# Patient Record
Sex: MALE | URBAN - METROPOLITAN AREA
[De-identification: names, ages, dates, MRNs, and addresses within clinical notes are randomized per-mention and may not be internally consistent; named-entity substitution may affect disease eponyms.]

---

## 2020-03-16 ENCOUNTER — EMERGENCY (EMERGENCY)
Facility: HOSPITAL | Age: 60
LOS: 1 days | Discharge: HOME | End: 2020-03-16
Attending: INTERNAL MEDICINE | Admitting: INTERNAL MEDICINE
Payer: SELF-PAY

## 2020-03-16 VITALS
OXYGEN SATURATION: 100 % | SYSTOLIC BLOOD PRESSURE: 225 MMHG | RESPIRATION RATE: 16 BRPM | WEIGHT: 205.03 LBS | HEART RATE: 101 BPM | TEMPERATURE: 98 F | DIASTOLIC BLOOD PRESSURE: 130 MMHG

## 2020-03-16 DIAGNOSIS — G45.9 TRANSIENT CEREBRAL ISCHEMIC ATTACK, UNSPECIFIED: ICD-10-CM

## 2020-03-16 DIAGNOSIS — I10 ESSENTIAL (PRIMARY) HYPERTENSION: ICD-10-CM

## 2020-03-16 LAB
ALBUMIN SERPL ELPH-MCNC: 4.3 G/DL — SIGNIFICANT CHANGE UP (ref 3.3–5)
ALP SERPL-CCNC: 77 U/L — SIGNIFICANT CHANGE UP (ref 40–120)
ALT FLD-CCNC: 49 U/L — HIGH (ref 10–45)
ANION GAP SERPL CALC-SCNC: 5 MMOL/L — SIGNIFICANT CHANGE UP (ref 5–17)
AST SERPL-CCNC: 27 U/L — SIGNIFICANT CHANGE UP (ref 10–40)
BILIRUB SERPL-MCNC: 0.5 MG/DL — SIGNIFICANT CHANGE UP (ref 0.2–1.2)
BUN SERPL-MCNC: 20 MG/DL — SIGNIFICANT CHANGE UP (ref 7–23)
CALCIUM SERPL-MCNC: 9.2 MG/DL — SIGNIFICANT CHANGE UP (ref 8.4–10.5)
CHLORIDE SERPL-SCNC: 107 MMOL/L — SIGNIFICANT CHANGE UP (ref 96–108)
CO2 SERPL-SCNC: 30 MMOL/L — SIGNIFICANT CHANGE UP (ref 22–31)
CREAT SERPL-MCNC: 1.24 MG/DL — SIGNIFICANT CHANGE UP (ref 0.5–1.3)
GLUCOSE SERPL-MCNC: 125 MG/DL — HIGH (ref 70–99)
HCT VFR BLD CALC: 50.6 % — HIGH (ref 39–50)
HGB BLD-MCNC: 16.8 G/DL — SIGNIFICANT CHANGE UP (ref 13–17)
MCHC RBC-ENTMCNC: 30.7 PG — SIGNIFICANT CHANGE UP (ref 27–34)
MCHC RBC-ENTMCNC: 33.2 GM/DL — SIGNIFICANT CHANGE UP (ref 32–36)
MCV RBC AUTO: 92.3 FL — SIGNIFICANT CHANGE UP (ref 80–100)
NRBC # BLD: 0 /100 WBCS — SIGNIFICANT CHANGE UP (ref 0–0)
PLATELET # BLD AUTO: 234 K/UL — SIGNIFICANT CHANGE UP (ref 150–400)
POTASSIUM SERPL-MCNC: 3.9 MMOL/L — SIGNIFICANT CHANGE UP (ref 3.5–5.3)
POTASSIUM SERPL-SCNC: 3.9 MMOL/L — SIGNIFICANT CHANGE UP (ref 3.5–5.3)
PROT SERPL-MCNC: 7.7 G/DL — SIGNIFICANT CHANGE UP (ref 6–8.3)
RBC # BLD: 5.48 M/UL — SIGNIFICANT CHANGE UP (ref 4.2–5.8)
RBC # FLD: 12.1 % — SIGNIFICANT CHANGE UP (ref 10.3–14.5)
SODIUM SERPL-SCNC: 142 MMOL/L — SIGNIFICANT CHANGE UP (ref 135–145)
TROPONIN I SERPL-MCNC: <.017 NG/ML — LOW (ref 0.02–0.06)
WBC # BLD: 7.34 K/UL — SIGNIFICANT CHANGE UP (ref 3.8–10.5)
WBC # FLD AUTO: 7.34 K/UL — SIGNIFICANT CHANGE UP (ref 3.8–10.5)

## 2020-03-16 PROCEDURE — 99220: CPT

## 2020-03-16 PROCEDURE — 99285 EMERGENCY DEPT VISIT HI MDM: CPT

## 2020-03-16 PROCEDURE — 71045 X-RAY EXAM CHEST 1 VIEW: CPT | Mod: 26

## 2020-03-16 PROCEDURE — 93880 EXTRACRANIAL BILAT STUDY: CPT | Mod: 26

## 2020-03-16 PROCEDURE — 93010 ELECTROCARDIOGRAM REPORT: CPT

## 2020-03-16 PROCEDURE — 99243 OFF/OP CNSLTJ NEW/EST LOW 30: CPT

## 2020-03-16 PROCEDURE — 70450 CT HEAD/BRAIN W/O DYE: CPT | Mod: 26

## 2020-03-16 RX ORDER — AMLODIPINE BESYLATE 2.5 MG/1
10 TABLET ORAL DAILY
Refills: 0 | Status: DISCONTINUED | OUTPATIENT
Start: 2020-03-16 | End: 2020-03-18

## 2020-03-16 RX ORDER — LABETALOL HCL 100 MG
10 TABLET ORAL ONCE
Refills: 0 | Status: COMPLETED | OUTPATIENT
Start: 2020-03-16 | End: 2020-03-16

## 2020-03-16 RX ORDER — ASPIRIN/CALCIUM CARB/MAGNESIUM 324 MG
81 TABLET ORAL DAILY
Refills: 0 | Status: DISCONTINUED | OUTPATIENT
Start: 2020-03-17 | End: 2020-03-18

## 2020-03-16 RX ORDER — SODIUM CHLORIDE 9 MG/ML
1000 INJECTION INTRAMUSCULAR; INTRAVENOUS; SUBCUTANEOUS
Refills: 0 | Status: COMPLETED | OUTPATIENT
Start: 2020-03-16 | End: 2020-03-16

## 2020-03-16 RX ORDER — ATORVASTATIN CALCIUM 80 MG/1
80 TABLET, FILM COATED ORAL AT BEDTIME
Refills: 0 | Status: DISCONTINUED | OUTPATIENT
Start: 2020-03-16 | End: 2020-03-20

## 2020-03-16 RX ORDER — ACETAMINOPHEN 500 MG
975 TABLET ORAL ONCE
Refills: 0 | Status: COMPLETED | OUTPATIENT
Start: 2020-03-16 | End: 2020-03-16

## 2020-03-16 RX ORDER — LABETALOL HCL 100 MG
10 TABLET ORAL
Refills: 0 | Status: COMPLETED | OUTPATIENT
Start: 2020-03-16 | End: 2020-03-16

## 2020-03-16 RX ORDER — ACETAMINOPHEN 500 MG
650 TABLET ORAL EVERY 6 HOURS
Refills: 0 | Status: DISCONTINUED | OUTPATIENT
Start: 2020-03-16 | End: 2020-03-20

## 2020-03-16 RX ORDER — ASPIRIN/CALCIUM CARB/MAGNESIUM 324 MG
162 TABLET ORAL DAILY
Refills: 0 | Status: DISCONTINUED | OUTPATIENT
Start: 2020-03-16 | End: 2020-03-16

## 2020-03-16 RX ADMIN — Medication 10 MILLIGRAM(S): at 11:51

## 2020-03-16 RX ADMIN — Medication 10 MILLIGRAM(S): at 13:16

## 2020-03-16 RX ADMIN — SODIUM CHLORIDE 1000 MILLILITER(S): 9 INJECTION INTRAMUSCULAR; INTRAVENOUS; SUBCUTANEOUS at 12:34

## 2020-03-16 RX ADMIN — Medication 162 MILLIGRAM(S): at 10:37

## 2020-03-16 RX ADMIN — Medication 975 MILLIGRAM(S): at 15:44

## 2020-03-16 RX ADMIN — Medication 10 MILLIGRAM(S): at 11:32

## 2020-03-16 RX ADMIN — Medication 650 MILLIGRAM(S): at 20:30

## 2020-03-16 RX ADMIN — Medication 650 MILLIGRAM(S): at 21:30

## 2020-03-16 RX ADMIN — AMLODIPINE BESYLATE 10 MILLIGRAM(S): 2.5 TABLET ORAL at 13:16

## 2020-03-16 RX ADMIN — SODIUM CHLORIDE 1000 MILLILITER(S): 9 INJECTION INTRAMUSCULAR; INTRAVENOUS; SUBCUTANEOUS at 10:34

## 2020-03-16 RX ADMIN — ATORVASTATIN CALCIUM 80 MILLIGRAM(S): 80 TABLET, FILM COATED ORAL at 21:14

## 2020-03-16 RX ADMIN — Medication 975 MILLIGRAM(S): at 16:39

## 2020-03-16 RX ADMIN — SODIUM CHLORIDE 1000 MILLILITER(S): 9 INJECTION INTRAMUSCULAR; INTRAVENOUS; SUBCUTANEOUS at 10:37

## 2020-03-16 RX ADMIN — SODIUM CHLORIDE 1000 MILLILITER(S): 9 INJECTION INTRAMUSCULAR; INTRAVENOUS; SUBCUTANEOUS at 11:32

## 2020-03-16 NOTE — H&P ADULT - NSHPLABSRESULTS_GEN_ALL_CORE
.  LABS:                         16.8   7.34  )-----------( 234      ( 16 Mar 2020 10:20 )             50.6     03-16    142  |  107  |  20  ----------------------------<  125<H>  3.9   |  30  |  1.24    Ca    9.2      16 Mar 2020 10:20    TPro  7.7  /  Alb  4.3  /  TBili  0.5  /  DBili  x   /  AST  27  /  ALT  49<H>  /  AlkPhos  77  03-16        CARDIAC MARKERS ( 16 Mar 2020 10:20 )  <.017 ng/mL / x     / x     / x     / x                RADIOLOGY, EKG & ADDITIONAL TESTS: Reviewed.     ECG: normal sinus  < from: Xray Chest 1 View AP/PA (03.16.20 @ 10:38) >      FINDINGS: Heart size appears within normal limits. No hilar or superior mediastinal abnormalities are identified.    There is no evidence for focal infiltrate, lobar consolidation or pulmonary edema. No pleural effusion or pneumothorax is demonstrated. No mediastinal shift is noted. The visualized osseous structures appear unremarkable.    IMPRESSION: No evidence for focal infiltrate or lobar consolidation.    < end of copied text >

## 2020-03-16 NOTE — H&P ADULT - ASSESSMENT
60 yo M with HTN presents with dizziness and RUL weakness , symptoms resolved    #Dizziness: suspects TIA in setting of uncontrolled HTN. CT head negative for acute infarct or hemorrhage.  - current NIHSS 0  - continue aspirin and Lipitor  - check lipid profile, HbA1C  - obtain ECHO, carotid duplex  - BP control as below  - Neurology consulted, Dr. Altamirano to see    #Hypertensive emergency: /130 on arrival, presented with TIA symptoms  - s/p labetalol 10mg IV in ED x 2  - Additional labetalol 10mg IV now as BP still ~200 systolic  - Start Amlodipine 10mg daily  - Goal reduction no more than 25%, aim for 160-170/80-90 within first 24 hrs  #DVT ppx:  - SCDs  - encourage ambulation    CAPRINI SCORE [CLOT]    AGE RELATED RISK FACTORS                                                       MOBILITY RELATED FACTORS  [x ] Age 41-60 years                                            (1 Point)                  [ ] Bed rest                                                        (1 Point)  [ ] Age: 61-74 years                                           (2 Points)                 [ ] Plaster cast                                                   (2 Points)  [ ] Age= 75 years                                              (3 Points)                 [ ] Bed bound for more than 72 hours                 (2 Points)    DISEASE RELATED RISK FACTORS                                               GENDER SPECIFIC FACTORS  [ ] Edema in the lower extremities                       (1 Point)                  [ ] Pregnancy                                                     (1 Point)  [ ] Varicose veins                                               (1 Point)                  [ ] Post-partum < 6 weeks                                   (1 Point)             [ ] BMI > 25 Kg/m2                                            (1 Point)                  [ ] Hormonal therapy  or oral contraception          (1 Point)                 [ ] Sepsis (in the previous month)                        (1 Point)                  [ ] History of pregnancy complications                 (1 point)  [ ] Pneumonia or serious lung disease                                               [ ] Unexplained or recurrent                     (1 Point)           (in the previous month)                               (1 Point)  [ ] Abnormal pulmonary function test                     (1 Point)                 SURGERY RELATED RISK FACTORS  [ ] Acute myocardial infarction                              (1 Point)                 [ ]  Section                                             (1 Point)  [ ] Congestive heart failure (in the previous month)  (1 Point)               [ ] Minor surgery                                                  (1 Point)   [ ] Inflammatory bowel disease                             (1 Point)                 [ ] Arthroscopic surgery                                        (2 Points)  [ ] Central venous access                                      (2 Points)                [ ] General surgery lasting more than 45 minutes   (2 Points)       [ ] Stroke (in the previous month)                          (5 Points)               [ ] Elective arthroplasty                                         (5 Points)                                                                                                                                               HEMATOLOGY RELATED FACTORS                                                 TRAUMA RELATED RISK FACTORS  [ ] Prior episodes of VTE                                     (3 Points)                [ ] Fracture of the hip, pelvis, or leg                       (5 Points)  [ ] Positive family history for VTE                         (3 Points)                 [ ] Acute spinal cord injury (in the previous month)  (5 Points)  [ ] Prothrombin 16949 A                                     (3 Points)                 [ ] Paralysis  (less than 1 month)                             (5 Points)  [ ] Factor V Leiden                                             (3 Points)                  [ ] Multiple Trauma within 1 month                        (5 Points)  [ ] Lupus anticoagulants                                     (3 Points)                                                           [ ] Anticardiolipin antibodies                               (3 Points)                                                       [ ] High homocysteine in the blood                      (3 Points)                                             [ ] Other congenital or acquired thrombophilia      (3 Points)                                                [ ] Heparin induced thrombocytopenia                  (3 Points)                                          Total Score [   1       ]    Caprini Score 0 - 2:  Low Risk, No VTE Prophylaxis required for most patients, encourage ambulation  Caprini Score 3 - 6:  At Risk, pharmacologic VTE prophylaxis is indicated for most patients (in the absence of a contraindication)  Caprini Score Greater than or = 7:  High Risk, pharmacologic VTE prophylaxis is indicated for most patients (in the absence of a contraindication)

## 2020-03-16 NOTE — H&P ADULT - NSHPPHYSICALEXAM_GEN_ALL_CORE
.  VITAL SIGNS:  T(C): 36.8 (03-16-20 @ 10:04), Max: 36.8 (03-16-20 @ 10:04)  T(F): 98.2 (03-16-20 @ 10:04), Max: 98.2 (03-16-20 @ 10:04)  HR: 69 (03-16-20 @ 12:26) (62 - 101)  BP: 194/91 (03-16-20 @ 12:26) (191/106 - 225/130)  BP(mean): 128 (03-16-20 @ 11:43) (128 - 128)  RR: 15 (03-16-20 @ 12:26) (11 - 16)  SpO2: 100% (03-16-20 @ 12:26) (99% - 100%)  Wt(kg): --    PHYSICAL EXAM:    Constitutional: WDWN resting comfortably in bed; NAD  Head: NC/AT  Eyes: PERRLA, EOMI, clear conjunctiva  ENT: no nasal discharge; no oropharyngeal erythema or exudates; moist oral mucosa  Neck: supple; no JVD or thyromegaly  Respiratory: CTA B/L; no W/R/R, no retractions  Cardiac: +S1/S2; RRR; no M/R/G; PMI non-displaced  Gastrointestinal: soft, NT/ND; no rebound or guarding; +BS, no hepatosplenomegaly  Extremities: WWP, no clubbing or cyanosis; no peripheral edema  Neurologic: AAOx3; answers questions appropriately, CN2-12 intact, sensation intact to light touch throughout, motor 5/5 in all extremities, no dysmetria on FTN or HTS, no DDK

## 2020-03-16 NOTE — H&P ADULT - HISTORY OF PRESENT ILLNESS
60 yo M with PMHx of HTN (self discontinued meds many years ago), former smoker presents with dizziness and RUL weakness that resolved in <5 min around 9:45AM today. Pt states he first felt dizzy then his right arm feels a little shaky, maybe weaker than usual. His symptoms all resolved by the time he arrived to ED which is 5 minutes away from his home. He denies headache, nausea, vomiting, chest pain, palpitations, slurred speech, word finding difficulty, numbness or tingling of extremities, fevers, chills, cough, SOB, abdominal pain, or urinary symptoms.    Of note, pt was on Losartan and Amlodipine many years ago, but self discontinued due to concern of side effects affecting his kidneys. He is a former smoker, 1 pack/year x 15 years, quit 15 years ago. 58 yo M with PMHx of HTN (self discontinued meds many years ago), former smoker presents with dizziness and RUL weakness that resolved in <5 min around 9:45AM today. Pt states he first felt dizzy then his right arm feels a little shaky, maybe weaker than usual. His symptoms all resolved by the time he arrived to ED which is 5 minutes away from his home. He denies headache, nausea, vomiting, chest pain, palpitations, slurred speech, word finding difficulty, numbness or tingling of extremities, fevers, chills, cough, SOB, abdominal pain, or urinary symptoms.    Of note, pt was on Losartan and Amlodipine many years ago, but self discontinued due to concern of side effects affecting his kidneys. He is a former smoker, 1 pack/year x 15 years, quit 15 years ago.

## 2020-03-16 NOTE — ED ADULT NURSE NOTE - OBJECTIVE STATEMENT
Pt presents to the ED awake, alert, oriented x3. Pt reports he felt a few seconds of dizziness about a half hour prior to arrival in ED. Pt reports dizziness has resolved at this time. Denies chest pain or shortness of breath.

## 2020-03-16 NOTE — H&P ADULT - NSHPSOCIALHISTORY_GEN_ALL_CORE
Former smoker, 1 pack/year x 15 years, quit 15 years ago  Occasional etOH use  Denies illicit drug use  Used to live in [place], moved to [place] in 2015 to take care of his mother after her stroke. Flies back and forth to [place]

## 2020-03-16 NOTE — PATIENT PROFILE ADULT - PSYCHOSOCIAL CONCERNS
Received a call from Mervin at [facility], she is requesting another PT referral for the pt. Will you please put in a new referral in pt's chart. Please let me know so I can fax the referral to mervin,     Thank you.    Mervin's call back # [phone]  Fax # [phone]   none

## 2020-03-16 NOTE — H&P ADULT - NSHPREVIEWOFSYSTEMS_GEN_ALL_CORE
REVIEW OF SYSTEMS:    CONSTITUTIONAL: No weakness, fevers or chills  EYES/ENT: No visual changes;  No throat pain   NECK: No pain or stiffness  RESPIRATORY: No cough, wheezing, hemoptysis; No shortness of breath  CARDIOVASCULAR: No chest pain or palpitations  GASTROINTESTINAL: No abdominal or epigastric pain. No nausea, vomiting, or hematemesis; No diarrhea or constipation. No melena or hematochezia.  GENITOURINARY: No dysuria, frequency or hematuria  NEUROLOGICAL: SEE HPI  SKIN: No itching, burning, rashes, or lesions   MSK: no joint pain, no joint swelling  All other review of systems is negative unless indicated above.

## 2020-03-17 LAB
ANION GAP SERPL CALC-SCNC: 6 MMOL/L — SIGNIFICANT CHANGE UP (ref 5–17)
BASOPHILS # BLD AUTO: 0.07 K/UL — SIGNIFICANT CHANGE UP (ref 0–0.2)
BASOPHILS NFR BLD AUTO: 0.9 % — SIGNIFICANT CHANGE UP (ref 0–2)
BUN SERPL-MCNC: 17 MG/DL — SIGNIFICANT CHANGE UP (ref 7–23)
CALCIUM SERPL-MCNC: 9.1 MG/DL — SIGNIFICANT CHANGE UP (ref 8.4–10.5)
CHLORIDE SERPL-SCNC: 106 MMOL/L — SIGNIFICANT CHANGE UP (ref 96–108)
CHOLEST SERPL-MCNC: 203 MG/DL — HIGH (ref 10–199)
CO2 SERPL-SCNC: 30 MMOL/L — SIGNIFICANT CHANGE UP (ref 22–31)
CREAT SERPL-MCNC: 1.14 MG/DL — SIGNIFICANT CHANGE UP (ref 0.5–1.3)
EOSINOPHIL # BLD AUTO: 0.1 K/UL — SIGNIFICANT CHANGE UP (ref 0–0.5)
EOSINOPHIL NFR BLD AUTO: 1.3 % — SIGNIFICANT CHANGE UP (ref 0–6)
GLUCOSE SERPL-MCNC: 109 MG/DL — HIGH (ref 70–99)
HCT VFR BLD CALC: 47 % — SIGNIFICANT CHANGE UP (ref 39–50)
HCV AB S/CO SERPL IA: 0.15 S/CO — SIGNIFICANT CHANGE UP (ref 0–0.99)
HCV AB SERPL-IMP: SIGNIFICANT CHANGE UP
HDLC SERPL-MCNC: 37 MG/DL — LOW
HGB BLD-MCNC: 15.5 G/DL — SIGNIFICANT CHANGE UP (ref 13–17)
IMM GRANULOCYTES NFR BLD AUTO: 0.4 % — SIGNIFICANT CHANGE UP (ref 0–1.5)
LIPID PNL WITH DIRECT LDL SERPL: 117 MG/DL — HIGH
LYMPHOCYTES # BLD AUTO: 2.73 K/UL — SIGNIFICANT CHANGE UP (ref 1–3.3)
LYMPHOCYTES # BLD AUTO: 35.1 % — SIGNIFICANT CHANGE UP (ref 13–44)
MAGNESIUM SERPL-MCNC: 1.7 MG/DL — SIGNIFICANT CHANGE UP (ref 1.6–2.6)
MCHC RBC-ENTMCNC: 30.5 PG — SIGNIFICANT CHANGE UP (ref 27–34)
MCHC RBC-ENTMCNC: 33 GM/DL — SIGNIFICANT CHANGE UP (ref 32–36)
MCV RBC AUTO: 92.5 FL — SIGNIFICANT CHANGE UP (ref 80–100)
MONOCYTES # BLD AUTO: 0.61 K/UL — SIGNIFICANT CHANGE UP (ref 0–0.9)
MONOCYTES NFR BLD AUTO: 7.9 % — SIGNIFICANT CHANGE UP (ref 2–14)
NEUTROPHILS # BLD AUTO: 4.23 K/UL — SIGNIFICANT CHANGE UP (ref 1.8–7.4)
NEUTROPHILS NFR BLD AUTO: 54.4 % — SIGNIFICANT CHANGE UP (ref 43–77)
NRBC # BLD: 0 /100 WBCS — SIGNIFICANT CHANGE UP (ref 0–0)
PLATELET # BLD AUTO: 223 K/UL — SIGNIFICANT CHANGE UP (ref 150–400)
POTASSIUM SERPL-MCNC: 4.1 MMOL/L — SIGNIFICANT CHANGE UP (ref 3.5–5.3)
POTASSIUM SERPL-SCNC: 4.1 MMOL/L — SIGNIFICANT CHANGE UP (ref 3.5–5.3)
RBC # BLD: 5.08 M/UL — SIGNIFICANT CHANGE UP (ref 4.2–5.8)
RBC # FLD: 12.2 % — SIGNIFICANT CHANGE UP (ref 10.3–14.5)
SODIUM SERPL-SCNC: 142 MMOL/L — SIGNIFICANT CHANGE UP (ref 135–145)
TOTAL CHOLESTEROL/HDL RATIO MEASUREMENT: 5.5 RATIO — SIGNIFICANT CHANGE UP (ref 3.4–9.6)
TRIGL SERPL-MCNC: 247 MG/DL — HIGH (ref 10–149)
TSH SERPL-MCNC: 4.46 UIU/ML — HIGH (ref 0.27–4.2)
WBC # BLD: 7.77 K/UL — SIGNIFICANT CHANGE UP (ref 3.8–10.5)
WBC # FLD AUTO: 7.77 K/UL — SIGNIFICANT CHANGE UP (ref 3.8–10.5)

## 2020-03-17 PROCEDURE — 93306 TTE W/DOPPLER COMPLETE: CPT | Mod: 26

## 2020-03-17 PROCEDURE — 99225: CPT

## 2020-03-17 RX ADMIN — Medication 650 MILLIGRAM(S): at 21:50

## 2020-03-17 RX ADMIN — AMLODIPINE BESYLATE 10 MILLIGRAM(S): 2.5 TABLET ORAL at 05:42

## 2020-03-17 RX ADMIN — ATORVASTATIN CALCIUM 80 MILLIGRAM(S): 80 TABLET, FILM COATED ORAL at 21:31

## 2020-03-17 RX ADMIN — Medication 650 MILLIGRAM(S): at 20:51

## 2020-03-17 RX ADMIN — Medication 81 MILLIGRAM(S): at 13:25

## 2020-03-17 NOTE — PROGRESS NOTE ADULT - ASSESSMENT
60 yo M with HTN presents with dizziness and RUL weakness , symptoms resolved    #Dizziness: Now resolved  Lijkely TIA in setting of uncontrolled HTN. CT head negative for acute infarct or hemorrhage.  Appreciate Neuro recs : will do MRI , pending ECHO/carotid duplex  Reviewed lipid profile: inc. TG/cholesterol, HbA1C pending  continue aspirin and Lipitor  US carotid duplex: neg for stenosis, ECHO findings reviewed as above  #Hypertensive emergency: /130 on arrival, presented with TIA symptoms  Currently BP: 150-160s/90s , cont Amlodipine/ will consider adding ACE/ARBs if remains uncontrolled    #DVT ppx: SCDs/ encourage ambulation 58 yo M with HTN presents with dizziness and RUL weakness , symptoms resolved    #Dizziness: Now resolved  Lijkely TIA in setting of uncontrolled HTN. CT head negative for acute infarct or hemorrhage.  Appreciate Neuro recs : will do MRI , US carotid duplex: neg for stenosis, ECHO findings reviewed as above  Reviewed lipid profile: inc. TG/cholesterol, HbA1C pending  continue aspirin and Lipitor  #Hypertensive emergency: /130 on arrival, presented with TIA symptoms  Currently BP: 150-160s/90s , cont Amlodipine/ will consider adding ACE/ARBs if remains uncontrolled    #DVT ppx: SCDs/ encourage ambulation

## 2020-03-17 NOTE — PROGRESS NOTE ADULT - SUBJECTIVE AND OBJECTIVE BOX
Patient is a 59y old  Male who presents with a chief complaint of dizziness    HPI  58 yo M with PMHx of HTN (self discontinued meds many years ago), former smoker presents with dizziness and RUL weakness that resolved in <5 min around 9:45AM today. Pt states he first felt dizzy then his right arm feels a little shaky, maybe weaker than usual. His symptoms all resolved by the time he arrived to ED which is 5 minutes away from his home. He denies headache, nausea, vomiting, chest pain, palpitations, slurred speech, word finding difficulty, numbness or tingling of extremities, fevers, chills, cough, SOB, abdominal pain, or urinary symptoms.of note, pt was on Losartan and Amlodipine many years ago, but self discontinued due to concern of side effects affecting his kidneys. He is a former smoker, 1 pack/year x 15 years, quit 15 years ago.     Interval Hx  Patient seen and examined at bedside. No overnight events reported.     ALLERGIES:  No Known Allergies    MEDICATIONS  (STANDING):  amLODIPine   Tablet 10 milliGRAM(s) Oral daily  aspirin enteric coated 81 milliGRAM(s) Oral daily  atorvastatin 80 milliGRAM(s) Oral at bedtime    MEDICATIONS  (PRN):  acetaminophen   Tablet .. 650 milliGRAM(s) Oral every 6 hours PRN Temp greater or equal to 38C (100.4F), Mild Pain (1 - 3)    Vital Signs Last 24 Hrs  T(F): 97.7 (17 Mar 2020 10:04), Max: 98.3 (17 Mar 2020 01:00)  HR: 79 (17 Mar 2020 10:04) (68 - 79)  BP: 157/94 (17 Mar 2020 10:04) (128/93 - 196/105)  RR: 17 (17 Mar 2020 10:04) (13 - 17)  SpO2: 93% (17 Mar 2020 10:04) (93% - 100%)  I&O's Summary    17 Mar 2020 07:01  -  17 Mar 2020 12:20  --------------------------------------------------------  IN: 120 mL / OUT: 0 mL / NET: 120 mL      PHYSICAL EXAM:  General: NAD, A/O x 3  ENT: MMM, no thrush  Neck: Supple, No JVD  Lungs: Clear to auscultation bilaterally, good air entry, non-labored breathing  Cardio: RRR, S1/S2, No murmur  Abdomen: Soft, Nontender, Nondistended; Bowel sounds present  Extremities: No calf tenderness, No pitting edema        LABS:                        15.5   7.77  )-----------( 223      ( 17 Mar 2020 05:42 )             47.0     03-17    142  |  106  |  17  ----------------------------<  109  4.1   |  30  |  1.14    Ca    9.1      17 Mar 2020 05:42  Mg     1.7     03-17    TPro  7.7  /  Alb  4.3  /  TBili  0.5  /  DBili  x   /  AST  27  /  ALT  49  /  AlkPhos  77  03-16        eGFR if Non African American: 70 mL/min/1.73M2 (03-17-20 @ 05:42)  eGFR if : 82 mL/min/1.73M2 (03-17-20 @ 05:42)          CARDIAC MARKERS ( 16 Mar 2020 10:20 )  <.017 ng/mL / x     / x     / x     / x          03-17 Chol 203 mg/dL  mg/dL HDL 37 mg/dL Trig 247 mg/dL  TSH 4.46   TSH with FT4 reflex --  Total T3 --      RADIOLOGY & ADDITIONAL TESTS:    < from: TTE Echo Complete w/ Contrast w/ Doppler (03.17.20 @ 10:00) >  . Left ventricular ejection fraction, by visual estimation, is 60 to 65%.   2. Normal global left ventricular systolic function.   3. Spectral Doppler shows impaired relaxation pattern of left ventricular myocardial filling (Grade I diastolic dysfunction).   4. There is mild septal left ventricular hypertrophy.   5. The left atrium is normal in size.   6. Normal right atrial size.   7. The right atrium is normal in size.   8. There is no evidence of pericardial effusion.   9. Thickening of the anterior and posterior mitral valve leaflets.  10. Sclerotic aortic valve with normal opening.  11. Structurally normal pulmonic valve, with normal leaflet excursion.    < end of copied text >      Care Discussed with Consultants/Other Providers:

## 2020-03-18 ENCOUNTER — TRANSCRIPTION ENCOUNTER (OUTPATIENT)
Age: 60
End: 2020-03-18

## 2020-03-18 ENCOUNTER — APPOINTMENT (OUTPATIENT)
Dept: MRI IMAGING | Facility: HOSPITAL | Age: 60
End: 2020-03-18

## 2020-03-18 ENCOUNTER — EMERGENCY (EMERGENCY)
Facility: HOSPITAL | Age: 60
LOS: 1 days | Discharge: HOME | End: 2020-03-18
Attending: EMERGENCY MEDICINE | Admitting: HOSPITALIST
Payer: SELF-PAY

## 2020-03-18 VITALS
DIASTOLIC BLOOD PRESSURE: 93 MMHG | TEMPERATURE: 98 F | OXYGEN SATURATION: 93 % | HEART RATE: 86 BPM | SYSTOLIC BLOOD PRESSURE: 149 MMHG | RESPIRATION RATE: 16 BRPM

## 2020-03-18 VITALS
HEART RATE: 95 BPM | DIASTOLIC BLOOD PRESSURE: 123 MMHG | RESPIRATION RATE: 19 BRPM | OXYGEN SATURATION: 98 % | WEIGHT: 199.96 LBS | HEIGHT: 69 IN | SYSTOLIC BLOOD PRESSURE: 181 MMHG | TEMPERATURE: 99 F

## 2020-03-18 LAB
ALBUMIN SERPL ELPH-MCNC: 4.5 G/DL — SIGNIFICANT CHANGE UP (ref 3.3–5)
ALP SERPL-CCNC: 79 U/L — SIGNIFICANT CHANGE UP (ref 40–120)
ALT FLD-CCNC: 57 U/L — HIGH (ref 10–45)
ANION GAP SERPL CALC-SCNC: 12 MMOL/L — SIGNIFICANT CHANGE UP (ref 5–17)
APTT BLD: 32.8 SEC — SIGNIFICANT CHANGE UP (ref 27.5–36.3)
AST SERPL-CCNC: 33 U/L — SIGNIFICANT CHANGE UP (ref 10–40)
BASOPHILS # BLD AUTO: 0.07 K/UL — SIGNIFICANT CHANGE UP (ref 0–0.2)
BASOPHILS NFR BLD AUTO: 0.7 % — SIGNIFICANT CHANGE UP (ref 0–2)
BILIRUB SERPL-MCNC: 0.9 MG/DL — SIGNIFICANT CHANGE UP (ref 0.2–1.2)
BUN SERPL-MCNC: 22 MG/DL — SIGNIFICANT CHANGE UP (ref 7–23)
CALCIUM SERPL-MCNC: 9.8 MG/DL — SIGNIFICANT CHANGE UP (ref 8.4–10.5)
CHLORIDE SERPL-SCNC: 104 MMOL/L — SIGNIFICANT CHANGE UP (ref 96–108)
CO2 SERPL-SCNC: 25 MMOL/L — SIGNIFICANT CHANGE UP (ref 22–31)
CREAT SERPL-MCNC: 1.19 MG/DL — SIGNIFICANT CHANGE UP (ref 0.5–1.3)
EOSINOPHIL # BLD AUTO: 0.13 K/UL — SIGNIFICANT CHANGE UP (ref 0–0.5)
EOSINOPHIL NFR BLD AUTO: 1.4 % — SIGNIFICANT CHANGE UP (ref 0–6)
GLUCOSE SERPL-MCNC: 96 MG/DL — SIGNIFICANT CHANGE UP (ref 70–99)
HBA1C BLD-MCNC: 5.6 % — SIGNIFICANT CHANGE UP (ref 4–5.6)
HCT VFR BLD CALC: 49.9 % — SIGNIFICANT CHANGE UP (ref 39–50)
HGB BLD-MCNC: 16.8 G/DL — SIGNIFICANT CHANGE UP (ref 13–17)
IMM GRANULOCYTES NFR BLD AUTO: 0.4 % — SIGNIFICANT CHANGE UP (ref 0–1.5)
INR BLD: 1.03 RATIO — SIGNIFICANT CHANGE UP (ref 0.88–1.16)
LYMPHOCYTES # BLD AUTO: 2.64 K/UL — SIGNIFICANT CHANGE UP (ref 1–3.3)
LYMPHOCYTES # BLD AUTO: 28.2 % — SIGNIFICANT CHANGE UP (ref 13–44)
MCHC RBC-ENTMCNC: 30.7 PG — SIGNIFICANT CHANGE UP (ref 27–34)
MCHC RBC-ENTMCNC: 33.7 GM/DL — SIGNIFICANT CHANGE UP (ref 32–36)
MCV RBC AUTO: 91.1 FL — SIGNIFICANT CHANGE UP (ref 80–100)
MONOCYTES # BLD AUTO: 0.72 K/UL — SIGNIFICANT CHANGE UP (ref 0–0.9)
MONOCYTES NFR BLD AUTO: 7.7 % — SIGNIFICANT CHANGE UP (ref 2–14)
NEUTROPHILS # BLD AUTO: 5.75 K/UL — SIGNIFICANT CHANGE UP (ref 1.8–7.4)
NEUTROPHILS NFR BLD AUTO: 61.6 % — SIGNIFICANT CHANGE UP (ref 43–77)
NRBC # BLD: 0 /100 WBCS — SIGNIFICANT CHANGE UP (ref 0–0)
PLATELET # BLD AUTO: 246 K/UL — SIGNIFICANT CHANGE UP (ref 150–400)
POTASSIUM SERPL-MCNC: 4.5 MMOL/L — SIGNIFICANT CHANGE UP (ref 3.5–5.3)
POTASSIUM SERPL-SCNC: 4.5 MMOL/L — SIGNIFICANT CHANGE UP (ref 3.5–5.3)
PROT SERPL-MCNC: 7.9 G/DL — SIGNIFICANT CHANGE UP (ref 6–8.3)
PROTHROM AB SERPL-ACNC: 11.5 SEC — SIGNIFICANT CHANGE UP (ref 10–12.9)
RBC # BLD: 5.48 M/UL — SIGNIFICANT CHANGE UP (ref 4.2–5.8)
RBC # FLD: 12.1 % — SIGNIFICANT CHANGE UP (ref 10.3–14.5)
SODIUM SERPL-SCNC: 141 MMOL/L — SIGNIFICANT CHANGE UP (ref 135–145)
TROPONIN I SERPL-MCNC: <.017 NG/ML — LOW (ref 0.02–0.06)
WBC # BLD: 9.35 K/UL — SIGNIFICANT CHANGE UP (ref 3.8–10.5)
WBC # FLD AUTO: 9.35 K/UL — SIGNIFICANT CHANGE UP (ref 3.8–10.5)

## 2020-03-18 PROCEDURE — 70450 CT HEAD/BRAIN W/O DYE: CPT

## 2020-03-18 PROCEDURE — 70498 CT ANGIOGRAPHY NECK: CPT | Mod: 26

## 2020-03-18 PROCEDURE — 80053 COMPREHEN METABOLIC PANEL: CPT

## 2020-03-18 PROCEDURE — 36415 COLL VENOUS BLD VENIPUNCTURE: CPT

## 2020-03-18 PROCEDURE — 70551 MRI BRAIN STEM W/O DYE: CPT

## 2020-03-18 PROCEDURE — G0378: CPT

## 2020-03-18 PROCEDURE — 70551 MRI BRAIN STEM W/O DYE: CPT | Mod: 26

## 2020-03-18 PROCEDURE — 99285 EMERGENCY DEPT VISIT HI MDM: CPT

## 2020-03-18 PROCEDURE — 93880 EXTRACRANIAL BILAT STUDY: CPT

## 2020-03-18 PROCEDURE — 86803 HEPATITIS C AB TEST: CPT

## 2020-03-18 PROCEDURE — C8929: CPT

## 2020-03-18 PROCEDURE — 83735 ASSAY OF MAGNESIUM: CPT

## 2020-03-18 PROCEDURE — 84443 ASSAY THYROID STIM HORMONE: CPT

## 2020-03-18 PROCEDURE — 93005 ELECTROCARDIOGRAM TRACING: CPT

## 2020-03-18 PROCEDURE — 70496 CT ANGIOGRAPHY HEAD: CPT | Mod: 26

## 2020-03-18 PROCEDURE — 84484 ASSAY OF TROPONIN QUANT: CPT

## 2020-03-18 PROCEDURE — 99231 SBSQ HOSP IP/OBS SF/LOW 25: CPT

## 2020-03-18 PROCEDURE — 80061 LIPID PANEL: CPT

## 2020-03-18 PROCEDURE — 93010 ELECTROCARDIOGRAM REPORT: CPT

## 2020-03-18 PROCEDURE — 85027 COMPLETE CBC AUTOMATED: CPT

## 2020-03-18 PROCEDURE — 99220: CPT | Mod: 25

## 2020-03-18 PROCEDURE — 71045 X-RAY EXAM CHEST 1 VIEW: CPT

## 2020-03-18 PROCEDURE — 83036 HEMOGLOBIN GLYCOSYLATED A1C: CPT

## 2020-03-18 PROCEDURE — 80048 BASIC METABOLIC PNL TOTAL CA: CPT

## 2020-03-18 PROCEDURE — 99217: CPT

## 2020-03-18 RX ORDER — LOSARTAN POTASSIUM 100 MG/1
1 TABLET, FILM COATED ORAL
Qty: 90 | Refills: 0
Start: 2020-03-18 | End: 2020-10-11

## 2020-03-18 RX ORDER — ACETAMINOPHEN 500 MG
650 TABLET ORAL EVERY 6 HOURS
Refills: 0 | Status: DISCONTINUED | OUTPATIENT
Start: 2020-03-18 | End: 2020-03-22

## 2020-03-18 RX ORDER — ATORVASTATIN CALCIUM 80 MG/1
1 TABLET, FILM COATED ORAL
Qty: 30 | Refills: 0
Start: 2020-03-18

## 2020-03-18 RX ORDER — AMLODIPINE BESYLATE 2.5 MG/1
5 TABLET ORAL DAILY
Refills: 0 | Status: DISCONTINUED | OUTPATIENT
Start: 2020-03-18 | End: 2020-03-22

## 2020-03-18 RX ORDER — LOSARTAN POTASSIUM 100 MG/1
100 TABLET, FILM COATED ORAL DAILY
Refills: 0 | Status: DISCONTINUED | OUTPATIENT
Start: 2020-03-18 | End: 2020-03-20

## 2020-03-18 RX ORDER — ATORVASTATIN CALCIUM 80 MG/1
40 TABLET, FILM COATED ORAL AT BEDTIME
Refills: 0 | Status: DISCONTINUED | OUTPATIENT
Start: 2020-03-18 | End: 2020-03-22

## 2020-03-18 RX ORDER — ASPIRIN/CALCIUM CARB/MAGNESIUM 324 MG
1 TABLET ORAL
Qty: 0 | Refills: 0 | DISCHARGE
Start: 2020-03-18

## 2020-03-18 RX ORDER — LOSARTAN POTASSIUM 100 MG/1
1 TABLET, FILM COATED ORAL
Qty: 90 | Refills: 0
Start: 2020-03-18 | End: 2020-07-07

## 2020-03-18 RX ORDER — ASPIRIN/CALCIUM CARB/MAGNESIUM 324 MG
1 TABLET ORAL
Qty: 30 | Refills: 0
Start: 2020-03-18

## 2020-03-18 RX ORDER — AMLODIPINE BESYLATE 2.5 MG/1
1 TABLET ORAL
Qty: 30 | Refills: 0
Start: 2020-03-18

## 2020-03-18 RX ORDER — LOSARTAN POTASSIUM 100 MG/1
100 TABLET, FILM COATED ORAL DAILY
Refills: 0 | Status: DISCONTINUED | OUTPATIENT
Start: 2020-03-18 | End: 2020-03-22

## 2020-03-18 RX ORDER — AMLODIPINE BESYLATE 2.5 MG/1
2.5 TABLET ORAL DAILY
Refills: 0 | Status: DISCONTINUED | OUTPATIENT
Start: 2020-03-19 | End: 2020-03-20

## 2020-03-18 RX ORDER — TRAMADOL HYDROCHLORIDE 50 MG/1
50 TABLET ORAL EVERY 8 HOURS
Refills: 0 | Status: DISCONTINUED | OUTPATIENT
Start: 2020-03-18 | End: 2020-03-18

## 2020-03-18 RX ORDER — LIDOCAINE 4 G/100G
1 CREAM TOPICAL DAILY
Refills: 0 | Status: DISCONTINUED | OUTPATIENT
Start: 2020-03-18 | End: 2020-03-22

## 2020-03-18 RX ORDER — LOSARTAN POTASSIUM 100 MG/1
1 TABLET, FILM COATED ORAL
Qty: 30 | Refills: 0
Start: 2020-03-18

## 2020-03-18 RX ORDER — ASPIRIN/CALCIUM CARB/MAGNESIUM 324 MG
81 TABLET ORAL DAILY
Refills: 0 | Status: DISCONTINUED | OUTPATIENT
Start: 2020-03-19 | End: 2020-03-22

## 2020-03-18 RX ADMIN — LIDOCAINE 1 PATCH: 4 CREAM TOPICAL at 21:54

## 2020-03-18 RX ADMIN — Medication 650 MILLIGRAM(S): at 10:31

## 2020-03-18 RX ADMIN — Medication 650 MILLIGRAM(S): at 10:30

## 2020-03-18 RX ADMIN — Medication 81 MILLIGRAM(S): at 11:12

## 2020-03-18 RX ADMIN — TRAMADOL HYDROCHLORIDE 50 MILLIGRAM(S): 50 TABLET ORAL at 20:06

## 2020-03-18 RX ADMIN — ATORVASTATIN CALCIUM 40 MILLIGRAM(S): 80 TABLET, FILM COATED ORAL at 21:54

## 2020-03-18 RX ADMIN — Medication 650 MILLIGRAM(S): at 23:46

## 2020-03-18 RX ADMIN — AMLODIPINE BESYLATE 10 MILLIGRAM(S): 2.5 TABLET ORAL at 06:04

## 2020-03-18 RX ADMIN — Medication 650 MILLIGRAM(S): at 22:46

## 2020-03-18 RX ADMIN — TRAMADOL HYDROCHLORIDE 50 MILLIGRAM(S): 50 TABLET ORAL at 19:24

## 2020-03-18 NOTE — ED ADULT NURSE NOTE - OBJECTIVE STATEMENT
59 yr old male c/o hypertension. Pt states I was just discharged and I am feeling dizzy and lightheaded again. Pt denies numbness, weakness, chest pain, shortness of breath. gait steady. PMHX: HTN

## 2020-03-18 NOTE — ED PROVIDER NOTE - ATTENDING CONTRIBUTION TO CARE
Dr. Mcgowan: I performed a face to face bedside interview with patient regarding history of present illness, review of symptoms and past medical history. I completed an independent physical exam.  I have discussed patient's plan of care with PA.   I agree with note as stated above, having amended the EMR as needed to reflect my findings.   This includes HISTORY OF PRESENT ILLNESS, HIV, PAST MEDICAL/SURGICAL/FAMILY/SOCIAL HISTORY, ALLERGIES AND HOME MEDICATIONS, REVIEW OF SYSTEMS, PHYSICAL EXAM, and any PROGRESS NOTES during the time I functioned as the attending physician for this patient.    see mdm

## 2020-03-18 NOTE — ED ADULT NURSE REASSESSMENT NOTE - NS ED NURSE REASSESS COMMENT FT1
Attempt to call report to the floor.  Per , "nurse will call you back, she is still receiving report".

## 2020-03-18 NOTE — H&P ADULT - NSHPREVIEWOFSYSTEMS_GEN_ALL_CORE
REVIEW OF SYSTEMS:    CONSTITUTIONAL: No weakness, fevers or chills  EYES/ENT: No visual changes;  No vertigo or throat pain   NECK: No pain or stiffness  RESPIRATORY: No cough, wheezing, hemoptysis; No shortness of breath  CARDIOVASCULAR: No chest pain or palpitations  GASTROINTESTINAL: No abdominal or epigastric pain. No nausea, vomiting, or hematemesis; No diarrhea or constipation. No melena or hematochezia.  GENITOURINARY: No dysuria, frequency or hematuria  NEUROLOGICAL: SEE HPI  SKIN: No itching, burning, rashes, or lesions   MSK: no joint pain, no joint swelling  All other review of systems is negative unless indicated above.

## 2020-03-18 NOTE — PROGRESS NOTE ADULT - SUBJECTIVE AND OBJECTIVE BOX
Patient is a 59y old  Male who presents with a chief complaint of dizziness (17 Mar 2020 12:19)    Interval Hx  Patient seen and examined at bedside. No overnight events reported. Symptoms resolved, currently asymptomatic.    ALLERGIES:  No Known Allergies    MEDICATIONS  (STANDING):  aspirin enteric coated 81 milliGRAM(s) Oral daily  atorvastatin 80 milliGRAM(s) Oral at bedtime    MEDICATIONS  (PRN):  acetaminophen   Tablet .. 650 milliGRAM(s) Oral every 6 hours PRN Temp greater or equal to 38C (100.4F), Mild Pain (1 - 3)    Vital Signs Last 24 Hrs  T(F): 97.5 (18 Mar 2020 10:30), Max: 98 (18 Mar 2020 05:32)  HR: 86 (18 Mar 2020 10:30) (63 - 86)  BP: 149/93 (18 Mar 2020 10:30) (138/97 - 149/93)  RR: 16 (18 Mar 2020 10:30) (16 - 17)  SpO2: 93% (18 Mar 2020 10:30) (92% - 96%)  I&O's Summary    17 Mar 2020 07:01  -  18 Mar 2020 07:00  --------------------------------------------------------  IN: 800 mL / OUT: 0 mL / NET: 800 mL    18 Mar 2020 07:01  -  18 Mar 2020 10:36  --------------------------------------------------------  IN: 120 mL / OUT: 0 mL / NET: 120 mL      PHYSICAL EXAM:  General: NAD, A/O x 3  ENT: MMM, no thrush  Neck: Supple, No JVD  Lungs: Clear to auscultation bilaterally, good air entry, non-labored breathing  Cardio: RRR, S1/S2, No murmur  Abdomen: Soft, Nontender, Nondistended; Bowel sounds present  Extremities: No calf tenderness, No pitting edema    LABS:                        15.5   7.77  )-----------( 223      ( 17 Mar 2020 05:42 )             47.0     03-17    142  |  106  |  17  ----------------------------<  109  4.1   |  30  |  1.14    Ca    9.1      17 Mar 2020 05:42  Mg     1.7     03-17    TPro  7.7  /  Alb  4.3  /  TBili  0.5  /  DBili  x   /  AST  27  /  ALT  49  /  AlkPhos  77  03-16        eGFR if Non African American: 70 mL/min/1.73M2 (03-17-20 @ 05:42)  eGFR if : 82 mL/min/1.73M2 (03-17-20 @ 05:42)          CARDIAC MARKERS ( 16 Mar 2020 10:20 )  <.017 ng/mL / x     / x     / x     / x          03-17 Chol 203 mg/dL  mg/dL HDL 37 mg/dL Trig 247 mg/dL  TSH 4.46   TSH with FT4 reflex --  Total T3 --                      RADIOLOGY & ADDITIONAL TESTS:    Care Discussed with Consultants/Other Providers: Patient is a 59y old  Male who presents with a chief complaint of dizziness (17 Mar 2020 12:19)    Interval Hx  Patient seen and examined at bedside. No overnight events reported. Symptoms resolved, currently asymptomatic.    ALLERGIES:  No Known Allergies    MEDICATIONS  (STANDING):  aspirin enteric coated 81 milliGRAM(s) Oral daily  atorvastatin 80 milliGRAM(s) Oral at bedtime    MEDICATIONS  (PRN):  acetaminophen   Tablet .. 650 milliGRAM(s) Oral every 6 hours PRN Temp greater or equal to 38C (100.4F), Mild Pain (1 - 3)    Vital Signs Last 24 Hrs  T(F): 97.5 (18 Mar 2020 10:30), Max: 98 (18 Mar 2020 05:32)  HR: 86 (18 Mar 2020 10:30) (63 - 86)  BP: 149/93 (18 Mar 2020 10:30) (138/97 - 149/93)  RR: 16 (18 Mar 2020 10:30) (16 - 17)  SpO2: 93% (18 Mar 2020 10:30) (92% - 96%)  I&O's Summary    17 Mar 2020 07:01  -  18 Mar 2020 07:00  --------------------------------------------------------  IN: 800 mL / OUT: 0 mL / NET: 800 mL    18 Mar 2020 07:01  -  18 Mar 2020 10:36  --------------------------------------------------------  IN: 120 mL / OUT: 0 mL / NET: 120 mL      PHYSICAL EXAM:  General: NAD, A/O x 3  ENT: MMM, no thrush  Neck: Supple, No JVD  Lungs: Clear to auscultation bilaterally, good air entry, non-labored breathing  Cardio: RRR, S1/S2, No murmur  Abdomen: Soft, Nontender, Nondistended; Bowel sounds present  Extremities: No calf tenderness, No pitting edema    LABS:                        15.5   7.77  )-----------( 223      ( 17 Mar 2020 05:42 )             47.0     03-17    142  |  106  |  17  ----------------------------<  109  4.1   |  30  |  1.14    Ca    9.1      17 Mar 2020 05:42  Mg     1.7     03-17    TPro  7.7  /  Alb  4.3  /  TBili  0.5  /  DBili  x   /  AST  27  /  ALT  49  /  AlkPhos  77  03-16        eGFR if Non African American: 70 mL/min/1.73M2 (03-17-20 @ 05:42)  eGFR if : 82 mL/min/1.73M2 (03-17-20 @ 05:42)          CARDIAC MARKERS ( 16 Mar 2020 10:20 )  <.017 ng/mL / x     / x     / x     / x          03-17 Chol 203 mg/dL  mg/dL HDL 37 mg/dL Trig 247 mg/dL  TSH 4.46   TSH with FT4 reflex --  Total T3 --                      RADIOLOGY & ADDITIONAL TESTS:    < from: TTE Echo Complete w/ Contrast w/ Doppler (03.17.20 @ 10:00) >  1. Left ventricular ejection fraction, by visual estimation, is 60 to 65%.   2. Normal global left ventricular systolic function.   3. Spectral Doppler shows impaired relaxation pattern of left ventricular myocardial filling (Grade I diastolic dysfunction).   4. There is mild septal left ventricular hypertrophy.   5. The left atrium is normal in size.   6. Normal right atrial size.   7. The right atrium is normal in size.   8. There is no evidence of pericardial effusion.   9. Thickening of the anterior and posterior mitral valve leaflets.  10. Sclerotic aortic valve with normal opening.  11. Structurally normal pulmonic valve, with normal leaflet excursion.      < end of copied text >      Care Discussed with Consultants/Other Providers:

## 2020-03-18 NOTE — ED PROVIDER NOTE - CLINICAL SUMMARY MEDICAL DECISION MAKING FREE TEXT BOX
Dr. Mcgowan:  59M h/o uncontrolled HTN, NV'ed from hospital 1 hour ago after admission for TIA, had neg MRI without contrast, returns because after going home pt developed few mins of left facial droop witnessed by niece, now back to baseline. No fevers, chills, chest pain, sob, nausea, vomiting, abdo pain. NIHSS 0. D/w prior hospitalist Dr. Echevarria and neuro Dr. Altamirano - rec no CT, obs for MRI.

## 2020-03-18 NOTE — H&P ADULT - NSHPLABSRESULTS_GEN_ALL_CORE
.  LABS:                         16.8   9.35  )-----------( 246      ( 18 Mar 2020 16:38 )             49.9     03-18    141  |  104  |  22  ----------------------------<  96  4.5   |  25  |  1.19    Ca    9.8      18 Mar 2020 16:38  Mg     1.7     03-17    TPro  7.9  /  Alb  4.5  /  TBili  0.9  /  DBili  x   /  AST  33  /  ALT  57<H>  /  AlkPhos  79  03-18    PT/INR - ( 18 Mar 2020 16:50 )   PT: 11.5 sec;   INR: 1.03 ratio         PTT - ( 18 Mar 2020 16:50 )  PTT:32.8 sec    CARDIAC MARKERS ( 18 Mar 2020 16:38 )  <.017 ng/mL / x     / x     / x     / x                RADIOLOGY, EKG & ADDITIONAL TESTS: Reviewed.     < from: CT Angio Neck w/ IV Cont (03.18.20 @ 18:06) >    CTA brain:  The cervical, petrous, lacerum, cavernous, clinoid and supraclinoid segments of both internal carotid arteries are widely patent and of normal course and caliber throughout the skull base. The middle and anterior cerebral arteries demonstrate symmetric arborization and patency without focal occlusion, hemodynamically significant stenosis or aneurysm. Anterior communicating artery is unremarkable.    The intradural vertebral arteries remain patent and codominant. Both posterior inferior cerebellar arteries are identified and unremarkable. The basilar artery and its tributaries, including the posterior cerebral arteries are widely patent and normal in caliber. Both posterior communicating arteries are identified and unremarkable. No focal occlusion, hemodynamically significant stenosis or aneurysm is seen in the posterior vertebral basilar circulation.    The major dural venous sinuses and peripheral venous tributaries enhance appropriately for phase of contrast administration. There is no abnormal intracranial enhancement. There is no evidence of a vascular malformation.      IMPRESSION:  CTA head: No major vessel occlusion, hemodynamically significant proximal stenosis or aneurysm.    CTA NECK: No major vessel occlusion, hemodynamically significant stenosis by NASCET criteria or dissection.    < end of copied text >    < from: MR Head No Cont (03.18.20 @ 13:34) >    FINDINGS:  There are no diffusion abnormalities to suggest acute/subacute infarct. There is no acute intracranial hemorrhage or vasogenic edema. Few punctate foci of T2/FLAIR prolongation are seen the bihemispheric white matter, nonspecific, but likely the sequela of minimal chronic microvascular change. No areas of abnormal susceptibility artifact in the brain parenchyma or leptomeningeal space. Midline structures on the sagittal T1-weighted images are unremarkable.    The ventricles, sulci and cisternal spaces are stable in size and unremarkable for age. There is no midline shift or abnormal extra-axial fluid collection.    Flow-voids of the major intracranial vessels are maintained, as seen best on the T2-weighted images, indicating their patency.    There is mild mucosal thickening in the ethmoid sinuses. The remaining paranasal sinuses and mastoid air cells are free of acute disease. The orbital regions are unremarkable.    IMPRESSION:  Unremarkable noncontrast brain MRI. No infarct or hemorrhage.    < end of copied text >    < from: TTE Echo Complete w/ Contrast w/ Doppler (03.17.20 @ 10:00) >    Summary:   1. Left ventricular ejection fraction, by visual estimation, is 60 to 65%.   2. Normal global left ventricular systolic function.   3. Spectral Doppler shows impaired relaxation pattern of left ventricular myocardial filling (Grade I diastolic dysfunction).   4. There is mild septal left ventricular hypertrophy.   5. The left atrium is normal in size.   6. Normal right atrial size.   7. The right atrium is normal in size.   8. There is no evidence of pericardial effusion.   9. Thickening of the anterior and posterior mitral valve leaflets.  10. Sclerotic aortic valve with normal opening.  11. Structurally normal pulmonic valve, with normal leaflet excursion.    < end of copied text >

## 2020-03-18 NOTE — DISCHARGE NOTE PROVIDER - HOSPITAL COURSE
Hospital Course    60 yo M with HTN presents with dizziness and RUL weakness , symptoms resolved, Suspected TIA . Pt was found to be in hypertensive emergency upon admission , /130 on arrival s/p iv labetalol pushes and his symptoms improved . He admits being noncompliant with his antihypertensive meds , restarted on home meds after BP was stable over 24 hours.    He had TIA w/u , CTH and MRI both were unremarkable, US carotid duplex neg for stenosis. Patient was cleared by neurology for discharge .         Source of Infection:    Antibiotic / Last Day:n/a        Palliative Care / Advanced Care Planning    Code Status:full    Patient/Family agreeable to Hospice/Palliative (Y/N)?n/a    Summary of Goals of Care Conversation:n/a        Discharging Provider: Danica Echevarira MD    Contact Info: Oyef [phone] - Please call with any questions or concerns.        Outpatient Provider: Dr. Rigo Peralta        time spent on discharge >32 minutes

## 2020-03-18 NOTE — DISCHARGE NOTE PROVIDER - CARE PROVIDER_API CALL
Rigo Peralta (MD)  [facility]  [address], [address]  [place] [address]  Phone: [phone]  Fax: [phone]  Follow Up Time:     Julio Altamirano; PhD)  Neurology  [address], [address]  [place] [address]  Phone: [phone]  Fax: [phone]  Follow Up Time:

## 2020-03-18 NOTE — DISCHARGE NOTE NURSING/CASE MANAGEMENT/SOCIAL WORK - NSDCPEEMAIL_GEN_ALL_CORE
[facility] for Tobacco Control email [email or web address]

## 2020-03-18 NOTE — H&P ADULT - HISTORY OF PRESENT ILLNESS
60 yo M with HTN presents to ED 1 hour after discharge from  for TIA now presents to ED with sudden onset of lightheadedness and a "strange sensation" in the left side of his head and mouth/cheek. Pt was admitted to  from 03/16- 03/18 for transient dizziness and RUE weakness, symptoms resolved. Pt had MRI negative for stroke and a normal ECHO, no events on monitor. Patient states on their way home from the hospital they stopped by his mothers house. While he was talking to her, he had sudden onset of dizziness described as lightheadedness and a "strange sensation" in the left side of his head and mouth/cheek. When he turned to his niece to take him back to the hospital, he had a left sided facial droop according to his niece (which resolved). He now complains only of a muscular right sided neck/posterior head pain. He denies chest pain, shortness of breath, fevers, chills, changes in vision/hearing, weakness, numbness, or other complaints. Pt took ASA 162mg PTA.

## 2020-03-18 NOTE — ED PROVIDER NOTE - OBJECTIVE STATEMENT
59 year old male, PMHx of HTN; presents to the ED for lightheadedness. Patient was admitted from the ED and discharged one hour ago for a TIA, normal CT head/MRI, seen by Dr. Altamirano. Patient states on their way home from the hospital they stopped by his mothers house. While he was talking to her, he had sudden onset of dizziness described as lightheadedness and a "strange sensation" in the left side of his head and mouth/cheek. When he turned to his niece to take him back to the hospital, he had a left sided facial droop according to his niece (which resolved). He now complains only of a muscular right sided neck/posterior head pain. He denies chest pain, shortness of breath, fevers, chills, changes in vision/hearing, weakness, numbness, or other complaints. Pt took ASA 162mg PTA.

## 2020-03-18 NOTE — DISCHARGE NOTE PROVIDER - NSDCCPCAREPLAN_GEN_ALL_CORE_FT
PRINCIPAL DISCHARGE DIAGNOSIS  Diagnosis: Brain TIA  Assessment and Plan of Treatment: symptoms resolved.      SECONDARY DISCHARGE DIAGNOSES  Diagnosis: Uncontrolled hypertension  Assessment and Plan of Treatment: please take medications as prescribed and follow up with your primary care doctor

## 2020-03-18 NOTE — PROGRESS NOTE ADULT - PROBLEM SELECTOR PLAN 1
Importance of complying with medications to control blood pressure, hyperlipidemia stressed. He will continue aspirin 81 mg daily. He will followup with his prior PCP, Dr. Rigo Peralta.

## 2020-03-18 NOTE — ED ADULT NURSE REASSESSMENT NOTE - NS ED NURSE REASSESS COMMENT FT1
Report received from JAMMIE Benítez.  Patient is admitted.  Previous RN unable to give report to the floor.

## 2020-03-18 NOTE — H&P ADULT - ASSESSMENT
60 yo M with HTN, TIA, just discharged from  returning for dizziness and headache    #Dizziness:   - CT head on admission negative for acute infarct or hemorrhage  - CTA head/neck sjpws no major vessel occlusion, no aneurysm or dissection  - continue aspirin and lipitor  - Dr. Altamirano notified, will see pt tomorrow    #HTN: hypertensive on arrival  - continue losartan 100mg daily  - increase Amlodipine to 5 mg daily  - send labs for secondary hypertension    #DVT ppx:  - encourage ambulation        IMPROVE VTE Individual Risk Assessment    RISK                                                                Points    [  ] Previous VTE                                                  3    [  ] Thrombophilia                                               2    [  ] Lower limb paralysis                                      2        (unable to hold up >15 seconds)      [  ] Current Cancer                                              2         (within 6 months)    [  ] Immobilization > 24 hrs                                1    [  ] ICU/CCU stay > 24 hours                              1    [  ] Age > 60                                                      1    IMPROVE VTE Score _____0____    IMPROVE Score 0-1: Low Risk, No VTE prophylaxis required for most patients, encourage ambulation.   IMPROVE Score 2-3: At risk, pharmacologic VTE prophylaxis is indicated for most patients (in the absence of a contraindication)  IMPROVE Score > or = 4: High Risk, pharmacologic VTE prophylaxis is indicated for most patients (in the absence of a contraindication)

## 2020-03-18 NOTE — DISCHARGE NOTE NURSING/CASE MANAGEMENT/SOCIAL WORK - PATIENT PORTAL LINK FT
You can access the FollowMyHealth Patient Portal offered by [facility] by registering at the following website: [email or web address]. By joining [employer]’s FollowMyHealth portal, you will also be able to view your health information using other applications (apps) compatible with our system.

## 2020-03-18 NOTE — ED PROVIDER NOTE - PROGRESS NOTE DETAILS
Dr. Echevarria- cleared from neurology standpoint- speak with Dr. Altamirano regarding w/u and dispo. Dr. Altamirano- pt to be admitted for better BP control as it may be inducing additional TIA symptoms. Does not recommend additional imaging at this time.

## 2020-03-18 NOTE — ED PROVIDER NOTE - CHPI ED SYMPTOMS NEG
no blurred vision/no confusion/no fever/no loss of consciousness/no nausea/no numbness/no vomiting/no weakness/no change in level of consciousness

## 2020-03-18 NOTE — H&P ADULT - NSHPPHYSICALEXAM_GEN_ALL_CORE
.  VITAL SIGNS:  T(C): 37 (03-18-20 @ 16:10), Max: 37 (03-18-20 @ 16:10)  T(F): 98.6 (03-18-20 @ 16:10), Max: 98.6 (03-18-20 @ 16:10)  HR: 90 (03-18-20 @ 16:44) (63 - 95)  BP: 143/105 (03-18-20 @ 16:44) (141/90 - 181/123)  BP(mean): --  RR: 18 (03-18-20 @ 16:44) (16 - 19)  SpO2: 97% (03-18-20 @ 16:44) (93% - 98%)  Wt(kg): --    PHYSICAL EXAM:    Constitutional: WDWN resting comfortably in bed; NAD  Head: NC/AT  Eyes: PERRLA, EOMI, clear conjunctiva  ENT: no nasal discharge; no oropharyngeal erythema or exudates; moist oral mucosa  Neck: posterior neck tender to palpation, supple; no JVD or thyromegaly  Respiratory: CTA B/L; no W/R/R, no retractions  Cardiac: +S1/S2; RRR; no M/R/G; PMI non-displaced  Gastrointestinal: soft, NT/ND; no rebound or guarding; +BS, no hepatosplenomegaly  Extremities: WWP, no clubbing or cyanosis; no peripheral edema  Neurologic: AAOx3; answers questions appropriately, CN2-12 intact, sensation intact to light touch throughout, motor 5/5 in all extremities, no dysmetria on FTN or HTS

## 2020-03-18 NOTE — DISCHARGE NOTE PROVIDER - NSDCMRMEDTOKEN_GEN_ALL_CORE_FT
amLODIPine 2.5 mg oral tablet: 1 tab(s) orally once a day  Aspirin Enteric Coated 81 mg oral delayed release tablet: 1 tab(s) orally once a day   atorvastatin 40 mg oral tablet: 1 tab(s) orally once a day   losartan 100 mg oral tablet: 1 tab(s) orally once a day

## 2020-03-18 NOTE — PROGRESS NOTE ADULT - ASSESSMENT
60 yo M with HTN presents with dizziness and RUL weakness , symptoms resolved    #Dizziness: Now resolved, suspect TIA/hypertensive emergceny  CT head negative for acute infarct or hemorrhage.  Pending MRI , US carotid duplex: neg for stenosis, ECHO findings reviewed as above  continue aspirin and Lipitor  #Hypertensive emergency: /130 on arrival, presented with TIA symptoms: Resolved  Currently BP controlled. will resume home meds , pt admits that he was on losartan 100 mg and amlodipine 2.5 mg and has been noncompliant with meds for sometime.  #DVT ppx: SCDs/ encourage ambulation  #Dispo: Home ,  Anticipate d/c ? today/tomorrow depending on MRI results which is pending.

## 2020-03-18 NOTE — DISCHARGE NOTE NURSING/CASE MANAGEMENT/SOCIAL WORK - NSDCPEWEB_GEN_ALL_CORE
[facility] for Tobacco Control website --- [email or web address] --- [email or web address]

## 2020-03-19 DIAGNOSIS — G45.9 TRANSIENT CEREBRAL ISCHEMIC ATTACK, UNSPECIFIED: ICD-10-CM

## 2020-03-19 DIAGNOSIS — G43.109 MIGRAINE WITH AURA, NOT INTRACTABLE, WITHOUT STATUS MIGRAINOSUS: ICD-10-CM

## 2020-03-19 LAB
ANION GAP SERPL CALC-SCNC: 12 MMOL/L — SIGNIFICANT CHANGE UP (ref 5–17)
BUN SERPL-MCNC: 22 MG/DL — SIGNIFICANT CHANGE UP (ref 7–23)
CALCIUM SERPL-MCNC: 9 MG/DL — SIGNIFICANT CHANGE UP (ref 8.4–10.5)
CHLORIDE SERPL-SCNC: 106 MMOL/L — SIGNIFICANT CHANGE UP (ref 96–108)
CO2 SERPL-SCNC: 25 MMOL/L — SIGNIFICANT CHANGE UP (ref 22–31)
CREAT SERPL-MCNC: 1.16 MG/DL — SIGNIFICANT CHANGE UP (ref 0.5–1.3)
CRP SERPL-MCNC: 0.22 MG/DL — SIGNIFICANT CHANGE UP (ref 0–0.4)
ERYTHROCYTE [SEDIMENTATION RATE] IN BLOOD: 5 MM/HR — SIGNIFICANT CHANGE UP (ref 0–20)
GLUCOSE SERPL-MCNC: 110 MG/DL — HIGH (ref 70–99)
POTASSIUM SERPL-MCNC: 4.2 MMOL/L — SIGNIFICANT CHANGE UP (ref 3.5–5.3)
POTASSIUM SERPL-SCNC: 4.2 MMOL/L — SIGNIFICANT CHANGE UP (ref 3.5–5.3)
SODIUM SERPL-SCNC: 143 MMOL/L — SIGNIFICANT CHANGE UP (ref 135–145)
T4 FREE SERPL-MCNC: 1.2 NG/DL — SIGNIFICANT CHANGE UP (ref 0.9–1.8)
TSH SERPL-MCNC: 4.69 UIU/ML — HIGH (ref 0.27–4.2)

## 2020-03-19 PROCEDURE — 99232 SBSQ HOSP IP/OBS MODERATE 35: CPT

## 2020-03-19 PROCEDURE — 99225: CPT

## 2020-03-19 RX ORDER — MECLIZINE HCL 12.5 MG
12.5 TABLET ORAL THREE TIMES A DAY
Refills: 0 | Status: DISCONTINUED | OUTPATIENT
Start: 2020-03-19 | End: 2020-03-19

## 2020-03-19 RX ADMIN — AMLODIPINE BESYLATE 5 MILLIGRAM(S): 2.5 TABLET ORAL at 05:33

## 2020-03-19 RX ADMIN — LOSARTAN POTASSIUM 100 MILLIGRAM(S): 100 TABLET, FILM COATED ORAL at 05:33

## 2020-03-19 RX ADMIN — LIDOCAINE 1 PATCH: 4 CREAM TOPICAL at 08:00

## 2020-03-19 RX ADMIN — Medication 81 MILLIGRAM(S): at 11:10

## 2020-03-19 RX ADMIN — Medication 650 MILLIGRAM(S): at 06:56

## 2020-03-19 RX ADMIN — LIDOCAINE 1 PATCH: 4 CREAM TOPICAL at 07:55

## 2020-03-19 RX ADMIN — Medication 650 MILLIGRAM(S): at 05:56

## 2020-03-19 NOTE — PROGRESS NOTE ADULT - SUBJECTIVE AND OBJECTIVE BOX
HPI: 58 yo M with HTN presents to ED 1 hour after discharge from  for TIA now presents to ED with sudden onset of lightheadedness and a "strange sensation" in the left side of his head and mouth/cheek. Pt was admitted to  from 03/16- 03/18 for transient dizziness and RUE weakness, symptoms resolved. Pt had MRI negative for stroke and a normal ECHO, no events on monitor. Patient states on their way home from the hospital they stopped by his mothers house. While he was talking to her, he had sudden onset of dizziness described as lightheadedness and a "strange sensation" in the left side of his head and mouth/cheek. When he turned to his niece to take him back to the hospital, he had a left sided facial droop according to his niece (which resolved). He now complains only of a muscular right sided neck/posterior head pain. He denies chest pain, shortness of breath, fevers, chills, changes in vision/hearing, weakness, numbness, or other complaints. Pt took ASA 162mg PTA.    SUBJECTIVE: Pt seen and evaluated at bedside. No acute events since admission. Hx verified with the patient. No fever, chills , chest pain, shortness of breath, fevers, chills, changes in vision/hearing, weakness, numbness or headache.    REVIEW OF SYSTEMS:  CONSTITUTIONAL: No weakness, fevers or chills  EYES/ENT: No visual changes;  No vertigo or throat pain   NECK: No pain or stiffness  RESPIRATORY: No cough, wheezing, hemoptysis; No shortness of breath  CARDIOVASCULAR: No chest pain or palpitations  GASTROINTESTINAL: No abdominal or epigastric pain. No nausea, vomiting, or hematemesis; No diarrhea or constipation. No melena or hematochezia.  GENITOURINARY: No dysuria, frequency or hematuria  NEUROLOGICAL: No numbness or weakness  SKIN: No itching, burning, rashes, or lesions   All other review of systems is negative unless indicated above    Vital Signs Last 24 Hrs  T(C): 36.8 (19 Mar 2020 09:42), Max: 37 (18 Mar 2020 16:10)  T(F): 98.2 (19 Mar 2020 09:42), Max: 98.6 (18 Mar 2020 16:10)  HR: 67 (19 Mar 2020 09:42) (67 - 97)  BP: 141/97 (19 Mar 2020 09:42) (140/90 - 181/123)  RR: 16 (19 Mar 2020 09:42) (16 - 19)  SpO2: 94% (19 Mar 2020 09:42) (93% - 99%)    I&O's Summary    19 Mar 2020 07:01  -  19 Mar 2020 10:52  --------------------------------------------------------  IN: 200 mL / OUT: 0 mL / NET: 200 mL        CAPILLARY BLOOD GLUCOSE          PHYSICAL EXAM:  Constitutional: NAD, awake and alert, well-developed  HEENT: PERR, EOMI, Normal Hearing, MMM  Neck: Soft and supple, No LAD, No JVD  Respiratory: Breath sounds are clear bilaterally, No wheezing, rales or rhonchi  Cardiovascular: S1 and S2, regular rate and rhythm, no Murmurs, gallops or rubs  Gastrointestinal: Bowel Sounds present, soft, nontender, nondistended, no guarding, no rebound  Extremities: No peripheral edema  Vascular: 2+ peripheral pulses  Neurological: A/O x 3, no focal deficits  Musculoskeletal: 5/5 strength b/l upper and lower extremities  Skin: No rashes    MEDICATIONS:  MEDICATIONS  (STANDING):  amLODIPine   Tablet 5 milliGRAM(s) Oral daily  aspirin enteric coated 81 milliGRAM(s) Oral daily  atorvastatin 40 milliGRAM(s) Oral at bedtime  lidocaine   Patch 1 Patch Transdermal daily  losartan 100 milliGRAM(s) Oral daily      LABS: All Labs Reviewed:                        16.8   9.35  )-----------( 246      ( 18 Mar 2020 16:38 )             49.9     03-19    143  |  106  |  22  ----------------------------<  110<H>  4.2   |  25  |  1.16    Ca    9.0      19 Mar 2020 05:55    TPro  7.9  /  Alb  4.5  /  TBili  0.9  /  DBili  x   /  AST  33  /  ALT  57<H>  /  AlkPhos  79  03-18    PT/INR - ( 18 Mar 2020 16:50 )   PT: 11.5 sec;   INR: 1.03 ratio         PTT - ( 18 Mar 2020 16:50 )  PTT:32.8 sec  CARDIAC MARKERS ( 18 Mar 2020 16:38 )  <.017 ng/mL / x     / x     / x     / x          Blood Culture:     RADIOLOGY/EKG:  < from: 12 Lead ECG (03.18.20 @ 16:27) >  Ventricular Rate 80 BPM    Atrial Rate 80 BPM    P-R Interval 148 ms    QRS Duration 104 ms    Q-T Interval 372 ms    QTC Calculation(Bezet) 429 ms    P Axis 59 degrees    R Axis 12 degrees    T Axis 51 degrees    Diagnosis Line Normal sinus rhythm  minor q waves in III and avF  Otherwise normal ECG  When compared with ECG of 16-MAR-2020 10:17,  No significant change was found    < from: CT Angio Head w/ IV Cont (03.18.20 @ 18:05) >  EXAM:  CT ANGIO NECK (W)AW IC    EXAM:  CT ANGIO BRAIN (W)AW IC      PROCEDURE DATE:  03/18/2020        INTERPRETATION:  Study: CTA head and Neck    CLINICAL HISTORY:      TECHNIQUE: CTA examination of the head and neck performed by obtaining helical axial images on a multi-detector CT unit during dynamic intravenous administration of iodinated contrast in the arterial phase. The image data was acquired utilizing submillimeter partitions and subsequently reconstructed into 3-dimensional maximumintensity projections and/or 3-D surface renditions in multiple planes using a separate work station. Both the axial source and reconstructed images were reviewed. 90 mls of Omnipaque 350 was administered intravenously without complication and 10 mlswere discarded.    COMPARISON: No prior angiographic study of the head or neck is available for comparison.    FINDINGS:    CTA neck:  Three-vessel arch and standard anatomical configuration. The arch and origins of the great vessels are patent.    Both common carotid arteries take a normal course and caliber throughout the cervical region. There is slightly higher bifurcation into the internal and and external carotid arteries on the left. The visualized portions of the external carotid arteriesand the cervical internal carotid arteries are widely patent and normal in caliber. No evidence of focal occlusion, hemodynamically significant stenosis or dissection of the anterior carotid circulation within the neck.    Origins of both vertebral arteries are identified and widely patent. The vertebral arteries are codominant. The take a normal course and caliber throughout the cervical region. No focal occlusion, hemodynamically significant stenosis or dissection is seen in the posterior vertebral circulation within the neck.    There is no evidence of vascular malformation in the neck.     The thyroid gland is unremarkable. There is no significant adenopathy in the neck. There are no acute osseous abnormalities.      CTA brain:  The cervical, petrous, lacerum, cavernous, clinoid and supraclinoid segments of both internal carotid arteries are widely patent and of normal course and caliber throughout the skull base. The middle and anterior cerebral arteries demonstrate symmetric arborization and patency without focal occlusion, hemodynamically significant stenosis or aneurysm. Anterior communicating artery is unremarkable.    The intradural vertebral arteries remain patent and codominant. Both posterior inferior cerebellar arteries are identified and unremarkable. The basilar artery and its tributaries, including the posterior cerebral arteries are widely patent and normal in caliber. Both posterior communicating arteries are identified and unremarkable. No focal occlusion, hemodynamically significant stenosis or aneurysm is seen in the posterior vertebral basilar circulation.    The major dural venous sinuses and peripheral venous tributaries enhance appropriately for phase of contrast administration. There is no abnormal intracranial enhancement. There is no evidence of a vascular malformation.      IMPRESSION:  CTA head: No major vessel occlusion, hemodynamically significant proximal stenosis or aneurysm.    CTA NECK: No major vessel occlusion, hemodynamically significant stenosis by NASCET criteria or dissection.      DVT PPX:    ADVANCED DIRECTIVE:    DISPOSITION: HPI: 60 yo M with HTN presents to ED 1 hour after discharge from  for TIA now presents to ED with sudden onset of lightheadedness and a "strange sensation" in the left side of his head and mouth/cheek. Pt was admitted to  from 03/16- 03/18 for transient dizziness and RUE weakness, symptoms resolved. Pt had MRI negative for stroke and a normal ECHO, no events on monitor. Patient states on their way home from the hospital they stopped by his mothers house. While he was talking to her, he had sudden onset of dizziness described as lightheadedness and a "strange sensation" in the left side of his head and mouth/cheek. When he turned to his niece to take him back to the hospital, he had a left sided facial droop according to his niece (which resolved). He now complains only of a muscular right sided neck/posterior head pain. He denies chest pain, shortness of breath, fevers, chills, changes in vision/hearing, weakness, numbness, or other complaints. Pt took ASA 162mg PTA.    SUBJECTIVE: Pt seen and evaluated at bedside. No acute events since admission. Hx verified with the patient. No fever, chills,chest pain, shortness of breath,changes in vision/hearing, weakness, numbness or headache.    REVIEW OF SYSTEMS:  CONSTITUTIONAL: No weakness, fevers or chills  EYES/ENT: No visual changes;  No vertigo or throat pain   NECK: No pain or stiffness  RESPIRATORY: No cough, wheezing, hemoptysis; No shortness of breath  CARDIOVASCULAR: No chest pain or palpitations  GASTROINTESTINAL: No abdominal or epigastric pain. No nausea, vomiting, or hematemesis; No diarrhea or constipation. No melena or hematochezia.  GENITOURINARY: No dysuria, frequency or hematuria  NEUROLOGICAL: No numbness or weakness  SKIN: No itching, burning, rashes, or lesions   All other review of systems is negative unless indicated above    Vital Signs Last 24 Hrs  T(C): 36.8 (19 Mar 2020 09:42), Max: 37 (18 Mar 2020 16:10)  T(F): 98.2 (19 Mar 2020 09:42), Max: 98.6 (18 Mar 2020 16:10)  HR: 67 (19 Mar 2020 09:42) (67 - 97)  BP: 141/97 (19 Mar 2020 09:42) (140/90 - 181/123)  RR: 16 (19 Mar 2020 09:42) (16 - 19)  SpO2: 94% (19 Mar 2020 09:42) (93% - 99%)    I&O's Summary    19 Mar 2020 07:01  -  19 Mar 2020 10:52  --------------------------------------------------------  IN: 200 mL / OUT: 0 mL / NET: 200 mL        CAPILLARY BLOOD GLUCOSE          PHYSICAL EXAM:  Constitutional: NAD, awake and alert, well-developed  HEENT: PERR, EOMI, Normal Hearing, MMM  Neck: Soft and supple, No LAD, No JVD  Respiratory: Breath sounds are clear bilaterally, No wheezing, rales or rhonchi  Cardiovascular: S1 and S2, regular rate and rhythm, no Murmurs, gallops or rubs  Gastrointestinal: Bowel Sounds present, soft, nontender, nondistended, no guarding, no rebound  Extremities: No peripheral edema  Vascular: 2+ peripheral pulses  Neurological: A/O x 3, no focal deficits  Musculoskeletal: 5/5 strength b/l upper and lower extremities  Skin: No rashes    MEDICATIONS:  MEDICATIONS  (STANDING):  amLODIPine   Tablet 5 milliGRAM(s) Oral daily  aspirin enteric coated 81 milliGRAM(s) Oral daily  atorvastatin 40 milliGRAM(s) Oral at bedtime  lidocaine   Patch 1 Patch Transdermal daily  losartan 100 milliGRAM(s) Oral daily      LABS: All Labs Reviewed:                        16.8   9.35  )-----------( 246      ( 18 Mar 2020 16:38 )             49.9     03-19    143  |  106  |  22  ----------------------------<  110<H>  4.2   |  25  |  1.16    Ca    9.0      19 Mar 2020 05:55    TPro  7.9  /  Alb  4.5  /  TBili  0.9  /  DBili  x   /  AST  33  /  ALT  57<H>  /  AlkPhos  79  03-18    PT/INR - ( 18 Mar 2020 16:50 )   PT: 11.5 sec;   INR: 1.03 ratio         PTT - ( 18 Mar 2020 16:50 )  PTT:32.8 sec  CARDIAC MARKERS ( 18 Mar 2020 16:38 )  <.017 ng/mL / x     / x     / x     / x          Blood Culture:     RADIOLOGY/EKG:  < from: 12 Lead ECG (03.18.20 @ 16:27) >  Ventricular Rate 80 BPM    Atrial Rate 80 BPM    P-R Interval 148 ms    QRS Duration 104 ms    Q-T Interval 372 ms    QTC Calculation(Bezet) 429 ms    P Axis 59 degrees    R Axis 12 degrees    T Axis 51 degrees    Diagnosis Line Normal sinus rhythm  minor q waves in III and avF  Otherwise normal ECG  When compared with ECG of 16-MAR-2020 10:17,  No significant change was found    < from: CT Angio Head w/ IV Cont (03.18.20 @ 18:05) >  EXAM:  CT ANGIO NECK (W)AW IC    EXAM:  CT ANGIO BRAIN (W)AW IC      PROCEDURE DATE:  03/18/2020        INTERPRETATION:  Study: CTA head and Neck    CLINICAL HISTORY:      TECHNIQUE: CTA examination of the head and neck performed by obtaining helical axial images on a multi-detector CT unit during dynamic intravenous administration of iodinated contrast in the arterial phase. The image data was acquired utilizing submillimeter partitions and subsequently reconstructed into 3-dimensional maximumintensity projections and/or 3-D surface renditions in multiple planes using a separate work station. Both the axial source and reconstructed images were reviewed. 90 mls of Omnipaque 350 was administered intravenously without complication and 10 mlswere discarded.    COMPARISON: No prior angiographic study of the head or neck is available for comparison.    FINDINGS:    CTA neck:  Three-vessel arch and standard anatomical configuration. The arch and origins of the great vessels are patent.    Both common carotid arteries take a normal course and caliber throughout the cervical region. There is slightly higher bifurcation into the internal and and external carotid arteries on the left. The visualized portions of the external carotid arteriesand the cervical internal carotid arteries are widely patent and normal in caliber. No evidence of focal occlusion, hemodynamically significant stenosis or dissection of the anterior carotid circulation within the neck.    Origins of both vertebral arteries are identified and widely patent. The vertebral arteries are codominant. The take a normal course and caliber throughout the cervical region. No focal occlusion, hemodynamically significant stenosis or dissection is seen in the posterior vertebral circulation within the neck.    There is no evidence of vascular malformation in the neck.     The thyroid gland is unremarkable. There is no significant adenopathy in the neck. There are no acute osseous abnormalities.      CTA brain:  The cervical, petrous, lacerum, cavernous, clinoid and supraclinoid segments of both internal carotid arteries are widely patent and of normal course and caliber throughout the skull base. The middle and anterior cerebral arteries demonstrate symmetric arborization and patency without focal occlusion, hemodynamically significant stenosis or aneurysm. Anterior communicating artery is unremarkable.    The intradural vertebral arteries remain patent and codominant. Both posterior inferior cerebellar arteries are identified and unremarkable. The basilar artery and its tributaries, including the posterior cerebral arteries are widely patent and normal in caliber. Both posterior communicating arteries are identified and unremarkable. No focal occlusion, hemodynamically significant stenosis or aneurysm is seen in the posterior vertebral basilar circulation.    The major dural venous sinuses and peripheral venous tributaries enhance appropriately for phase of contrast administration. There is no abnormal intracranial enhancement. There is no evidence of a vascular malformation.      IMPRESSION:  CTA head: No major vessel occlusion, hemodynamically significant proximal stenosis or aneurysm.    CTA NECK: No major vessel occlusion, hemodynamically significant stenosis by NASCET criteria or dissection.      DVT PPX:    ADVANCED DIRECTIVE:    DISPOSITION:

## 2020-03-19 NOTE — PROGRESS NOTE ADULT - ASSESSMENT
History suggestive of complicated migraine. Recurrent TIA in same distribution as noted on prior admission in patient with significantly elevated blood pressure remains in the differential.    Advised starting coenzyme Q 10 200 mg daily for migraine prophylaxis. Monitor blood pressure and cardiac rhythm for additional day and if stable and asymptomatic he can be followed as an outpatient. Discussed with Dr. Echevarria.

## 2020-03-19 NOTE — PROGRESS NOTE ADULT - ATTENDING COMMENTS
I have personally seen and examined patient on the above date.  I discussed the case with Dr. Spence, resident and I agree with findings and plan as detailed per note above, which I have amended where appropriate.    Patient with recent admission for dizziness and RUL weakness , symptoms resolved, Suspected TIA , found to be in hypertensive emergency upon admission , /130 on arrival s/p iv labetalol pushes and his symptoms improved . restarted on home meds after BP was stable over 24 hours returns with dizziness and headache within 2 hours of discharge yesterday.  He had TIA w/u yesterday , CTH and MRI both were unremarkable, US carotid duplex neg for stenosis and Patient was cleared by neurology for discharge .   Upon readmission yesterday CTA head and neck done in ED which was unremarkable .  Currently asymptomatic . Will await Neurology follow up . May need ENT work up which can be done outpt  Meclizine prn for dizziness, unsure if headache is related to ? TIA vs complicated migraine as pt also reports having facial droopiness during the episode which have now resolved  BP currently stable, w/u for secondary htn underway  PT eval for unsteady gait .

## 2020-03-19 NOTE — PROGRESS NOTE ADULT - SUBJECTIVE AND OBJECTIVE BOX
Shortly after discharge from the hospital yesterday he developed a brief left-sided head sensation and was noted by his niece to have a left facial droop which resolved. He provides additional history of having a pressure-like headache for the last 4 days with posterior neck pain. He provides additional history of recurrent pressure type headaches that may be associated with photophobia and can last all day.    Because of the neck pain and transient neurologic symptoms a CT angiogram of the head and neck were performed and showed no abnormalities.    Neurologic examination and 2:30 PM is normal.

## 2020-03-19 NOTE — PROGRESS NOTE ADULT - ASSESSMENT
60 yo M with HTN, TIA, just discharged from  returning for dizziness and headache    #Dizziness:   - CT head negative for acute infarct or hemorrhage  - CTA head/neck no major vessel occlusion, no aneurysm or dissection  - continue aspirin and Lipitor  - Neurology evaluation pending     #HTN: hypertensive on arrival  -losartan 100mg daily  -Amlodipine 5 mg daily  -send labs for secondary hypertension pending     #DVT ppx:  - encourage ambulation 58 yo M with HTN, TIA, just discharged from  returning for dizziness and headache    #Dizziness:   -CT head negative for acute infarct or hemorrhage  -CTA head/neck no major vessel occlusion, no aneurysm or dissection  -continue aspirin and Lipitor  -ESR: normal   -Neurology evaluation pending       #HTN: hypertensive on arrival  -losartan 100mg daily  -Amlodipine 5 mg daily  -send labs for secondary hypertension pending     #DVT ppx:  - encourage ambulation 58 yo M with HTN, TIA, just discharged from  returning for dizziness and headache    #Dizziness:   -CT head negative for acute infarct or hemorrhage  -CTA head/neck no major vessel occlusion, no aneurysm or dissection  -continue aspirin and Lipitor  -ESR: normal   -Neurology evaluation pending       #HTN  -losartan 100mg daily  -Amlodipine 5 mg daily  -send labs for secondary hypertension pending     #DVT ppx:  - encourage ambulation 60 yo M with HTN, TIA, just discharged from  returning for dizziness and headache    #Dizziness:   -CT head negative for acute infarct or hemorrhage  -CTA head/neck no major vessel occlusion, no aneurysm or dissection  -continue aspirin and Lipitor  -ESR: normal   -Neurology evaluation pending       #HTN  -losartan 100mg daily  -Amlodipine 5 mg daily  -labs for secondary hypertension pending     #DVT ppx:  - encourage ambulation 58 yo M with HTN, TIA, just discharged from  returning for dizziness and headache    #Dizziness:   -CT head negative for acute infarct or hemorrhage  -CTA head/neck no major vessel occlusion, no aneurysm or dissection  MRI unremarkable  -continue aspirin and Lipitor  -ESR: normal   -Neurology evaluation pending       #HTN  -losartan 100mg daily  -Amlodipine 5 mg daily  -labs for secondary hypertension pending     #DVT ppx:  - encourage ambulation

## 2020-03-20 ENCOUNTER — TRANSCRIPTION ENCOUNTER (OUTPATIENT)
Age: 60
End: 2020-03-20

## 2020-03-20 VITALS
RESPIRATION RATE: 16 BRPM | SYSTOLIC BLOOD PRESSURE: 141 MMHG | OXYGEN SATURATION: 94 % | DIASTOLIC BLOOD PRESSURE: 88 MMHG | HEART RATE: 80 BPM | TEMPERATURE: 98 F

## 2020-03-20 LAB
ALDOST SERPL-MCNC: 9.5 NG/DL — SIGNIFICANT CHANGE UP

## 2020-03-20 PROCEDURE — 80048 BASIC METABOLIC PNL TOTAL CA: CPT

## 2020-03-20 PROCEDURE — 86140 C-REACTIVE PROTEIN: CPT

## 2020-03-20 PROCEDURE — 83835 ASSAY OF METANEPHRINES: CPT

## 2020-03-20 PROCEDURE — 99231 SBSQ HOSP IP/OBS SF/LOW 25: CPT

## 2020-03-20 PROCEDURE — 85652 RBC SED RATE AUTOMATED: CPT

## 2020-03-20 PROCEDURE — 85027 COMPLETE CBC AUTOMATED: CPT

## 2020-03-20 PROCEDURE — 84443 ASSAY THYROID STIM HORMONE: CPT

## 2020-03-20 PROCEDURE — 84439 ASSAY OF FREE THYROXINE: CPT

## 2020-03-20 PROCEDURE — 93005 ELECTROCARDIOGRAM TRACING: CPT

## 2020-03-20 PROCEDURE — G0378: CPT

## 2020-03-20 PROCEDURE — 36415 COLL VENOUS BLD VENIPUNCTURE: CPT

## 2020-03-20 PROCEDURE — 99284 EMERGENCY DEPT VISIT MOD MDM: CPT | Mod: 25

## 2020-03-20 PROCEDURE — 84244 ASSAY OF RENIN: CPT

## 2020-03-20 PROCEDURE — 86038 ANTINUCLEAR ANTIBODIES: CPT

## 2020-03-20 PROCEDURE — 80053 COMPREHEN METABOLIC PANEL: CPT

## 2020-03-20 PROCEDURE — 70498 CT ANGIOGRAPHY NECK: CPT

## 2020-03-20 PROCEDURE — 85730 THROMBOPLASTIN TIME PARTIAL: CPT

## 2020-03-20 PROCEDURE — 85610 PROTHROMBIN TIME: CPT

## 2020-03-20 PROCEDURE — 82088 ASSAY OF ALDOSTERONE: CPT

## 2020-03-20 PROCEDURE — 70496 CT ANGIOGRAPHY HEAD: CPT

## 2020-03-20 PROCEDURE — 84484 ASSAY OF TROPONIN QUANT: CPT

## 2020-03-20 PROCEDURE — 99217: CPT

## 2020-03-20 RX ORDER — NADOLOL 80 MG/1
1 TABLET ORAL
Qty: 15 | Refills: 0
Start: 2020-03-20 | End: 2020-04-03

## 2020-03-20 RX ADMIN — AMLODIPINE BESYLATE 5 MILLIGRAM(S): 2.5 TABLET ORAL at 05:56

## 2020-03-20 RX ADMIN — Medication 81 MILLIGRAM(S): at 11:32

## 2020-03-20 RX ADMIN — LOSARTAN POTASSIUM 100 MILLIGRAM(S): 100 TABLET, FILM COATED ORAL at 05:56

## 2020-03-20 NOTE — DISCHARGE NOTE PROVIDER - NSDCFUADDINST_GEN_ALL_CORE_FT
Follow up with Dr. Altamirano, neurology, in 1 week Follow up with Dr. Altamirano, neurology, in 1 week    Stop Norvasc. Start Nadolol instead (for BP and for migraines). Monitor your BP and keep a log so you can provide this information to your PMD

## 2020-03-20 NOTE — DISCHARGE NOTE PROVIDER - NSDCMRMEDTOKEN_GEN_ALL_CORE_FT
amLODIPine 2.5 mg oral tablet: 1 tab(s) orally once a day  Aspirin Enteric Coated 81 mg oral delayed release tablet: 1 tab(s) orally once a day   atorvastatin 40 mg oral tablet: 1 tab(s) orally once a day   Corgard 40 mg oral tablet: 1 tab(s) orally once a day   losartan 100 mg oral tablet: 1 tab(s) orally once a day Aspirin Enteric Coated 81 mg oral delayed release tablet: 1 tab(s) orally once a day   atorvastatin 40 mg oral tablet: 1 tab(s) orally once a day   Corgard 40 mg oral tablet: 1 tab(s) orally once a day   losartan 100 mg oral tablet: 1 tab(s) orally once a day

## 2020-03-20 NOTE — DISCHARGE NOTE PROVIDER - NSDCCPCAREPLAN_GEN_ALL_CORE_FT
PRINCIPAL DISCHARGE DIAGNOSIS  Diagnosis: Complicated migraine  Assessment and Plan of Treatment: You were diagnosed with complicated migraine. You are started on Nadolol 40mg. You should take 1 tablet everyday. After you discharge, you should follow with Dr. Julio Altamirano (Neurologist). You should also follow up with your PCP soon after discharge.      SECONDARY DISCHARGE DIAGNOSES  Diagnosis: TIA (transient ischemic attack)  Assessment and Plan of Treatment: PRINCIPAL DISCHARGE DIAGNOSIS  Diagnosis: Complicated migraine  Assessment and Plan of Treatment: You were diagnosed with complicated migraine. You are started on Nadolol 40mg. You should take 1 tablet everyday. After you discharge, you should follow with Dr. Julio Altamirano (Neurologist). You should also follow up with your PCP soon after discharge.      SECONDARY DISCHARGE DIAGNOSES  Diagnosis: HTN (hypertension)  Assessment and Plan of Treatment:     Diagnosis: TIA (transient ischemic attack)  Assessment and Plan of Treatment:

## 2020-03-20 NOTE — DISCHARGE NOTE PROVIDER - HOSPITAL COURSE
58 yo M with HTN who presented  to ED 1 hour after discharge from  for TIA for sudden onset of lightheadedness and a "strange sensation" in the left side of his head and mouth/cheek. Pt was admitted to  from 03/16- 03/18 for transient dizziness and RUE weakness, symptoms resolved. Pt had MRI negative for stroke and a normal ECHO, no events on monitor.     During his admission, patient underwent CT angio of head and neck which was negative for any acute pathology. Patient was evaluated by Neurology and was diagnosed with complex migraine syndrome and will be discharged  on Nadolol 40mg PO daily.                 < from: CT Angio Neck w/ IV Cont (03.18.20 @ 18:06) >        CTA neck:    Three-vessel arch and standard anatomical configuration. The arch and origins of the great vessels are patent.        Both common carotid arteries take a normal course and caliber throughout the cervical region. There is slightly higher bifurcation into the internal and and external carotid arteries on the left. The visualized portions of the external carotid arteriesand the cervical internal carotid arteries are widely patent and normal in caliber. No evidence of focal occlusion, hemodynamically significant stenosis or dissection of the anterior carotid circulation within the neck.        Origins of both vertebral arteries are identified and widely patent. The vertebral arteries are codominant. The take a normal course and caliber throughout the cervical region. No focal occlusion, hemodynamically significant stenosis or dissection is seen in the posterior vertebral circulation within the neck.        There is no evidence of vascular malformation in the neck.         The thyroid gland is unremarkable. There is no significant adenopathy in the neck. There are no acute osseous abnormalities.            CTA brain:    The cervical, petrous, lacerum, cavernous, clinoid and supraclinoid segments of both internal carotid arteries are widely patent and of normal course and caliber throughout the skull base. The middle and anterior cerebral arteries demonstrate symmetric arborization and patency without focal occlusion, hemodynamically significant stenosis or aneurysm. Anterior communicating artery is unremarkable.        The intradural vertebral arteries remain patent and codominant. Both posterior inferior cerebellar arteries are identified and unremarkable. The basilar artery and its tributaries, including the posterior cerebral arteries are widely patent and normal in caliber. Both posterior communicating arteries are identified and unremarkable. No focal occlusion, hemodynamically significant stenosis or aneurysm is seen in the posterior vertebral basilar circulation.        The major dural venous sinuses and peripheral venous tributaries enhance appropriately for phase of contrast administration. There is no abnormal intracranial enhancement. There is no evidence of a vascular malformation.            IMPRESSION:    CTA head: No major vessel occlusion, hemodynamically significant proximal stenosis or aneurysm.        CTA NECK: No major vessel occlusion, hemodynamically significant stenosis by NASCET criteria or dissection.

## 2020-03-20 NOTE — DISCHARGE NOTE PROVIDER - NSDCPNSUBOBJ_GEN_ALL_CORE
Patient's neuro exam is normal today.        Patient's diagnosis is atypical migraine, doubt TIA.     Started on Nadolol for migraines (and for HTN)        Essential HTN: change norvasc and Losartant to nadolol         Outpatient neurology followup Patient's neuro exam is normal today.        Patient's diagnosis is atypical migraine, doubt TIA.     Started on Nadolol for migraines (and for HTN)        Essential HTN: D/c norvasc, start nadolol , c/w ARB	        Outpatient neurology followup

## 2020-03-20 NOTE — PROGRESS NOTE ADULT - SUBJECTIVE AND OBJECTIVE BOX
He continues to have posterior head pressure pain radiating to both orbits associated with some photophobia. His blood pressure has come down to normotensive range.    Neurologic examination remains normal.

## 2020-03-20 NOTE — PROGRESS NOTE ADULT - PROBLEM SELECTOR PLAN 1
Consider switching antihypertensive medication to a long-acting beta blocker such as nadolol 40 mg once daily. He should followup with his primary care physician and contact me if his headaches persist.    He can be discharged home today.
as noted above

## 2020-03-20 NOTE — DISCHARGE NOTE NURSING/CASE MANAGEMENT/SOCIAL WORK - PATIENT PORTAL LINK FT
You can access the FollowMyHealth Patient Portal offered by [facility] by registering at the following website: [email or web address]. By joining [employer]’s FollowMyHealth portal, you will also be able to view your health information using other applications (apps) compatible with our system.

## 2020-03-20 NOTE — DISCHARGE NOTE PROVIDER - ATTENDING COMMENTS
I have personally seen and examined patient on the above date.  I discussed the case with Dr. Spence and I agree with findings and plan as detailed per note above, which I have amended where appropriate.              Time spent on d/c 32 min.

## 2020-03-20 NOTE — DISCHARGE NOTE PROVIDER - CARE PROVIDER_API CALL
Rigo Peralta (MD)  [facility]  [address], [address]  [place] [address]  Phone: [phone]  Fax: [phone]  Follow Up Time:     Julio Altamirano; PhD)  Neurology  [address], [address]  [place] [address]  Phone: [phone]  Fax: [phone]  Follow Up Time:

## 2020-03-22 LAB
ANA TITR SER: NEGATIVE — SIGNIFICANT CHANGE UP

## 2020-03-23 LAB
METANEPHRINE, PL: 21 PG/ML — SIGNIFICANT CHANGE UP (ref 0–62)
NORMETANEPHRINE, PL: 93 PG/ML — SIGNIFICANT CHANGE UP (ref 0–145)

## 2020-03-23 RX ORDER — NADOLOL 80 MG/1
1 TABLET ORAL
Qty: 90 | Refills: 0
Start: 2020-03-23 | End: 2020-10-11

## 2020-03-23 RX ORDER — NADOLOL 80 MG/1
1 TABLET ORAL
Qty: 30 | Refills: 0
Start: 2020-03-23 | End: 2020-04-21

## 2020-03-23 RX ORDER — NADOLOL 80 MG/1
1 TABLET ORAL
Qty: 90 | Refills: 0
Start: 2020-03-23 | End: 2020-07-07

## 2020-03-25 LAB
RENIN PLAS-CCNC: 2.83 NG/ML/HR — SIGNIFICANT CHANGE UP (ref 0.17–5.38)

## 2020-03-25 RX ORDER — ATORVASTATIN CALCIUM 80 MG/1
1 TABLET, FILM COATED ORAL
Qty: 90 | Refills: 0
Start: 2020-03-25 | End: 2020-10-11

## 2020-03-25 RX ORDER — ATORVASTATIN CALCIUM 80 MG/1
1 TABLET, FILM COATED ORAL
Qty: 90 | Refills: 0
Start: 2020-03-25 | End: 2020-07-07

## 2020-03-25 RX ORDER — ATORVASTATIN CALCIUM 80 MG/1
1 TABLET, FILM COATED ORAL
Qty: 30 | Refills: 0
Start: 2020-03-25 | End: 2020-04-23

## 2020-07-14 NOTE — CHART NOTE - NSCHARTNOTEFT_GEN_A_CORE
Patient contacted me for medication refills. He moved to [place] and has not yet been able to see a PCP due to the COVID19 pandemic. Medication refills sent to patient's preferred pharmacy.
Patient contacted me again, unable to see his PCP due to COVID19. Requesting refill of Nadolol, Losartan and Lipitor. Will re-prescribe. I also urged patient to see his PCP as soon as possible.

## 2020-09-19 NOTE — CONSULT NOTE ADULT - SUBJECTIVE AND OBJECTIVE BOX
59-year-old man with history of hypertension and 15-pack-year smoking history had a several minute episode of lightheadedness and right upper limb weakness. Came to the emergency room with a blood pressure of 230/135 this morning. He stopped his blood pressure medication several years ago.    Family history reveals his mother had a stroke.    CT of head normal. Carotid ultrasound normal.    On examination:  Alert and oriented. No aphasia. The visual fields are full to confrontation. Pupils equal and constrict to light. Extraocular movements intact. Smile symmetric. Hearing intact to finger rub. Palate rises symmetrically and tongue protrudes in midline. No focal weakness or sensory loss. No pathologic reflexes. Heart sounds are normal. No murmurs heard. No bruits in the neck.
None

## 2020-09-22 NOTE — PATIENT PROFILE ADULT - HAS THE PATIENT BEEN ADMITTED FROM SHORT TERM REHAB?
Update History & Physical    The patient's History and Physical of September 11, 2020 was reviewed with the patient and I examined the patient. There was no change. I concur with findings. Here today for bilateral total knee arthroplasty. NPO. No medications taken this am. No blood thinners in last 7 days. Denies recent or current chest pain/pressure, palpitations, SOB, recent URI, N/V/D or constipation, fever or chills. Review vitals per RN flowsheet.      Electronically signed by ALLYSON Ramirez CNP on 9/22/2020 at 11:37 AM
no

## 2021-11-23 NOTE — ED PROVIDER NOTE - PHYSICAL EXAMINATION
Possibly from NSAID gastritis. Zofran prn, PPI  Urine culture mildly positive. PO Keflex ordered
NIH STROKE SCALE =0

## 2023-03-23 NOTE — DISCHARGE NOTE NURSING/CASE MANAGEMENT/SOCIAL WORK - NURSING SECTION COMPLETE
Anesthesia Pre Eval Note    Anesthesia ROS/Med Hx        Anesthetic Complication History:    No History of difficult airway  No history of malignant hyperthermia  No PONV  No history pseudocholinesterase deficiencyNo history of awareness of surgery under anesthesia    Pulmonary Review:    Positive for sleep apnea     Cardiovascular Review:    Positive for CAD  Positive for hypertension  Positive for hyperlipidemia    GI/HEPATIC/RENAL Review:    Positive for renal disease    End/Other Review:  Positive for diabetes  Positive for obesity   Additional Results:     ALLERGIES:  No Known Allergies       Last Labs        Component                Value               Date/Time                  WBC                      6.4                 10/29/2022 0705            RBC                      4.44 (L)            10/29/2022 0705            HGB                      14.1                10/29/2022 0705            HCT                      40.6                10/29/2022 0705            MCV                      91.4                10/29/2022 0705            MCH                      31.8                10/29/2022 0705            MCHC                     34.7                10/29/2022 0705            RDW-CV                   12.2                10/29/2022 0705            Sodium                   137                 10/29/2022 0705            Potassium                4.6                 10/29/2022 0705            Chloride                 102                 10/29/2022 0705            Carbon Dioxide           28                  10/29/2022 0705            Glucose                  124 (H)             10/29/2022 0705            BUN                      22 (H)              10/29/2022 0705            Creatinine               0.84                10/29/2022 0705            Glomerular Filtrati*     >90                 10/29/2022 0705            Calcium                  8.9                 10/29/2022 0705            PLT                      234                  10/29/2022 0705        Past Medical History:  01/01/2006: ASHD (arteriosclerotic heart disease)  No date: Diabetes Mellitus  No date: Hyperlipidemia  No date: Hypogonadism male  No date: Kidney stone  No date: Obesity  No date: Sleep Apnea      Comment:  doesnt use    Past Surgical History:  No date: Cardiac catherization  No date: Insert intracoronary stent      Comment:  PTCA, Single Stent  No date: Tonsillectomy       Prior to Admission medications :  Medication pantoprazole (PROTONIX) 40 MG tablet, Sig Take 1 tablet by mouth daily., Start Date 2/21/23, End Date , Taking? Yes, Authorizing Provider Angelita Mendoza PA-C    Medication tamsulosin (FLOMAX) 0.4 MG Cap, Sig Take 1 capsule by mouth nightly., Start Date 1/30/23, End Date , Taking? Yes, Authorizing Provider Kirt Carrion MD    Medication SITagliptan (Januvia) 100 MG tablet, Sig Take 1 tablet by mouth daily., Start Date 1/17/23, End Date , Taking? Yes, Authorizing Provider Kirt Carrion MD    Medication losartan (COZAAR) 100 MG tablet, Sig Take 1 tablet by mouth daily., Start Date 1/9/23, End Date , Taking? Yes, Authorizing Provider Kirt Carrion MD    Medication glipiZIDE (GLUCOTROL XL) 10 MG 24 hr tablet, Sig Take 1 tablet by mouth daily., Start Date 1/9/23, End Date , Taking? Yes, Authorizing Provider Kirt Carrion MD    Medication atorvastatin (LIPITOR) 40 MG tablet, Sig Take 1 tablet by mouth daily., Start Date 1/9/23, End Date , Taking? Yes, Authorizing Provider Kirt Carrion MD    Medication metFORMIN (GLUCOPHAGE-XR) 500 MG 24 hr tablet, Sig Take 3 tablets by mouth daily (with breakfast)., Start Date 11/28/22, End Date , Taking? Yes, Authorizing Provider Kirt Carrion MD    Medication Ascorbic Acid (vitamin C) 1000 MG tablet, Sig Take 1,000 mg by mouth daily., Start Date , End Date , Taking? Yes, Authorizing Provider Outside Provider    Medication aspirin 81 MG tablet, Sig Take 1 tablet by mouth daily after a meal., Start Date  2/27/18, End Date , Taking? Yes, Authorizing Provider Kirt Carrion MD    Medication cholecalciferol (VITAMIN D3) 1000 UNITS tablet, Sig Take 1,000 Units by mouth daily., Start Date , End Date , Taking? Yes, Authorizing Provider Outside Provider    Medication Omega-3 Fatty Acids (OMEGA 3 PO), Sig Take 3 capsules by mouth daily. , Start Date , End Date , Taking? Yes, Authorizing Provider Outside Provider    Medication naproxen sodium (ALEVE) 220 MG tablet, Sig Take 220 mg by mouth daily., Start Date , End Date , Taking? Yes, Authorizing Provider Outside Provider    Medication blood glucose test strip, Sig Test blood sugar 1 times daily as directed. Diagnosis: E11.9 Meter: pharmacist choice, Start Date 1/18/19, End Date , Taking? , Authorizing Provider Kirt Carrion MD    Medication blood glucose meter, Sig Test blood sugar 1 times daily as directed. Diagnosis: E11.9. Meter: pharmacist choice, Start Date 1/18/19, End Date , Taking? , Authorizing Provider Kirt Carrion MD    Medication Glucose Blood (BLOOD GLUCOSE TEST STRIPS) Strip, Sig Test once daily and if symptomatic.  Diabetes type 2, not on insulin Dx:E11.9 Length of need 99, Start Date 1/25/16, End Date , Taking? , Authorizing Provider Kirt Carrion MD    Medication DISPENSE, Sig Glucose test meter kit. Test once daily and if symptomatic.  Diabetes type 2, not on insulin Dx:E11.9 Length of need 99, Start Date 1/25/16, End Date , Taking? , Authorizing Provider Kirt Carrion MD    Medication Lancets Misc. Kit, Sig Test once daily and if symptomatic Diabetes type 2, not on insulin Dx:E11.9 Length of need 99, Start Date 1/25/16, End Date , Taking? , Authorizing Provider Kirt Carrion MD    Medication Lancets Thin Misc, Sig Test once daily and if symptomatic. Diabetes type 2, not on insulin Dx:E11.9 Length of need 99, Start Date 1/25/16, End Date , Taking? , Authorizing Provider Kirt Carrion MD            Relevant Problems   Anesthesia Problems   (+)  Obstructive sleep apnea (adult) (pediatric)       Physical Exam     Airway   Mallampati: III  TM Distance: <3 FB  Neck ROM: Full  Neck: Able to place in sniff position  TMJ Mobility: Good    Cardiovascular    Cardio Rhythm: Regular  Cardio Rate: Normal    Dental Exam  Dental exam normal    Pulmonary Exam  Pulmonary exam normal    Abdominal Exam    Patient Demonstrates:  Obese      Anesthesia Plan:  No phone call attempted due to:  ASA Status: 3  Anesthesia Type: MAC    Induction: Intravenous  Premedication: IV and None      Post-op Pain Management: Per Surgeon      Checklist  Reviewed: Lab Results, Patient Summary, Care Everywhere, Allergies, Past Med History, Medications, DNR Status, Beta Blocker Status, Nursing Notes, Consultations, Outside Records, NPO Status and Problem list  Consent/Risks Discussed Statement:  The proposed anesthetic plan, including its risks and benefits, have been discussed with the Patient along with the risks and benefits of alternatives. Questions were encouraged and answered and the patient and/or representative understands and agrees to proceed.        I discussed with the patient (and/or patient's legal representative) the risks and benefits of the proposed anesthesia plan, MAC, which may include services performed by other anesthesia providers.    Alternative anesthesia plans, if available, were reviewed with the patient (and/or patient's legal representative). Discussion has been held with the patient (and/or patient's legal representative) regarding risks of anesthesia, which include Intra-operative Awareness, Allergic Reaction, ICU Admit, Sore Throat, Vomiting, Dental Injury, Nausea, Need for Blood Transfusion, Nerve Injury, Aspiration, Post-op Intubation, Headache and Hypotension and emergent situations that may require change in anesthesia plan.    The patient (and/or patient's legal representative) has indicated understanding, his/her questions have been answered, and he/she  wishes to proceed with the planned anesthetic.      Blood Products: Not Anticipated     Patient/Caregiver provided printed discharge information.

## 2023-11-17 RX ORDER — AMLODIPINE BESYLATE 2.5 MG/1
1 TABLET ORAL
Qty: 0 | Refills: 0 | DISCHARGE

## 2023-11-17 RX ORDER — LOSARTAN POTASSIUM 100 MG/1
1 TABLET, FILM COATED ORAL
Qty: 0 | Refills: 0 | DISCHARGE